# Patient Record
Sex: FEMALE | Race: WHITE | NOT HISPANIC OR LATINO | Employment: STUDENT | URBAN - METROPOLITAN AREA
[De-identification: names, ages, dates, MRNs, and addresses within clinical notes are randomized per-mention and may not be internally consistent; named-entity substitution may affect disease eponyms.]

---
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
## 2019-11-15 ENCOUNTER — OFFICE VISIT (OUTPATIENT)
Dept: INTERNAL MEDICINE CLINIC | Facility: CLINIC | Age: 19
End: 2019-11-15
Payer: COMMERCIAL

## 2019-11-15 VITALS
HEIGHT: 64 IN | DIASTOLIC BLOOD PRESSURE: 74 MMHG | BODY MASS INDEX: 20.45 KG/M2 | RESPIRATION RATE: 12 BRPM | OXYGEN SATURATION: 99 % | WEIGHT: 119.8 LBS | TEMPERATURE: 101 F | HEART RATE: 104 BPM | SYSTOLIC BLOOD PRESSURE: 116 MMHG

## 2019-11-15 DIAGNOSIS — J02.9 SORE THROAT: Primary | ICD-10-CM

## 2019-11-15 LAB — S PYO AG THROAT QL: NEGATIVE

## 2019-11-15 PROCEDURE — 87880 STREP A ASSAY W/OPTIC: CPT | Performed by: NURSE PRACTITIONER

## 2019-11-15 PROCEDURE — 87070 CULTURE OTHR SPECIMN AEROBIC: CPT | Performed by: NURSE PRACTITIONER

## 2019-11-15 PROCEDURE — 87147 CULTURE TYPE IMMUNOLOGIC: CPT | Performed by: NURSE PRACTITIONER

## 2019-11-15 PROCEDURE — 99203 OFFICE O/P NEW LOW 30 MIN: CPT | Performed by: NURSE PRACTITIONER

## 2019-11-15 RX ORDER — AMOXICILLIN AND CLAVULANATE POTASSIUM 875; 125 MG/1; MG/1
1 TABLET, FILM COATED ORAL EVERY 12 HOURS SCHEDULED
Qty: 14 TABLET | Refills: 0 | Status: SHIPPED | OUTPATIENT
Start: 2019-11-15 | End: 2019-11-22

## 2019-11-15 NOTE — PROGRESS NOTES
Assessment/Plan:    BMI 20 0-20 9, adult  BMI Counseling: Body mass index is 20 56 kg/m²  The BMI is normal       Diagnoses and all orders for this visit:    Sore throat  -     Cancel: Throat culture; Future  -     POCT rapid strepA  -     Throat culture; Future  -     Throat culture  -     amoxicillin-clavulanate (AUGMENTIN) 875-125 mg per tablet; Take 1 tablet by mouth every 12 (twelve) hours for 7 days    BMI 20 0-20 9, adult          Subjective:      Patient ID: Martín Sinclair is a 20 y o  female  Since ysterday patient has been having fever of 101, sore throat and right sided swollen gland and R ear ache  No cough no congestion no chills      The following portions of the patient's history were reviewed and updated as appropriate: allergies, current medications, past family history, past medical history, past social history, past surgical history and problem list     Review of Systems   Constitutional: Positive for chills, fatigue and fever  HENT: Positive for sore throat  Eyes: Negative  Respiratory: Negative  Cardiovascular: Negative  Gastrointestinal: Negative  Musculoskeletal: Negative  Neurological: Negative  Objective:      /74 (BP Location: Left arm, Patient Position: Sitting, Cuff Size: Adult)   Pulse 104   Temp (!) 101 °F (38 3 °C) (Oral)   Resp 12   Ht 5' 4" (1 626 m)   Wt 54 3 kg (119 lb 12 8 oz)   SpO2 99%   BMI 20 56 kg/m²          Physical Exam   Constitutional: She is oriented to person, place, and time  She appears well-developed and well-nourished  HENT:   Head: Normocephalic and atraumatic  Right Ear: External ear normal    Left Ear: External ear normal    Nose: Nose normal    Mouth/Throat: Posterior oropharyngeal erythema present  R submandibular gland swollen   Eyes: Pupils are equal, round, and reactive to light  Conjunctivae are normal    Neck: Normal range of motion  Neck supple  Cardiovascular: Normal rate and regular rhythm  Pulmonary/Chest: Effort normal and breath sounds normal    Abdominal: Soft  Bowel sounds are normal    Musculoskeletal: Normal range of motion  Neurological: She is alert and oriented to person, place, and time  Skin: Skin is warm and dry  Nursing note and vitals reviewed

## 2019-11-18 LAB — BACTERIA THROAT CULT: ABNORMAL

## 2019-11-27 ENCOUNTER — OFFICE VISIT (OUTPATIENT)
Dept: URGENT CARE | Age: 19
End: 2019-11-27
Payer: COMMERCIAL

## 2019-11-27 VITALS
WEIGHT: 118 LBS | HEIGHT: 64 IN | HEART RATE: 94 BPM | OXYGEN SATURATION: 97 % | TEMPERATURE: 98.1 F | RESPIRATION RATE: 16 BRPM | SYSTOLIC BLOOD PRESSURE: 118 MMHG | DIASTOLIC BLOOD PRESSURE: 63 MMHG | BODY MASS INDEX: 20.14 KG/M2

## 2019-11-27 DIAGNOSIS — J02.0 STREP THROAT: Primary | ICD-10-CM

## 2019-11-27 LAB — S PYO AG THROAT QL: NEGATIVE

## 2019-11-27 PROCEDURE — 87880 STREP A ASSAY W/OPTIC: CPT | Performed by: PHYSICIAN ASSISTANT

## 2019-11-27 PROCEDURE — S9083 URGENT CARE CENTER GLOBAL: HCPCS | Performed by: PHYSICIAN ASSISTANT

## 2019-11-27 PROCEDURE — G0382 LEV 3 HOSP TYPE B ED VISIT: HCPCS | Performed by: PHYSICIAN ASSISTANT

## 2019-11-27 RX ORDER — AZITHROMYCIN 250 MG/1
TABLET, FILM COATED ORAL
Qty: 6 TABLET | Refills: 0 | Status: SHIPPED | OUTPATIENT
Start: 2019-11-27 | End: 2019-12-01

## 2019-11-28 NOTE — PROGRESS NOTES
[facility]        NAME: Francisco Champion is a 20 y o  female  : 2000    MRN: [id number]  DATE: 2019  TIME: 8:54 PM    Assessment and Plan   Strep throat [J02 0]  1  Strep throat  POCT rapid strepA    azithromycin (ZITHROMAX) 250 mg tablet         Patient Instructions       Follow up with PCP in 3-5 days  Proceed to  ER if symptoms worsen  Chief Complaint     Chief Complaint   Patient presents with    Sore Throat     Pt complaining of sore throat x1 week  She finished a course of amoxicillin for strep throat 2-3 days ago  History of Present Illness       Patient here for evaluation of return of a sore throat  Patient was recently treated for strep non A non G non C on culture  Patient was on Augmentin and finish the course  She did feel better and her throat looked better  She did not switch her toothbrush  Review of Systems   Review of Systems   Constitutional: Negative  HENT: Positive for sore throat  Negative for congestion, ear pain, postnasal drip, rhinorrhea, sinus pressure, sinus pain and trouble swallowing  Eyes: Negative  Respiratory: Negative  Cardiovascular: Negative            Current Medications       Current Outpatient Medications:     norgestimate-ethinyl estradiol (ORTHO-CYCLEN) 0 25-35 MG-MCG per tablet, Take 1 tablet by mouth daily, Disp: , Rfl:     azithromycin (ZITHROMAX) 250 mg tablet, Take 2 tablets day 1 then 1 tab days 2-5, Disp: 6 tablet, Rfl: 0    ondansetron (ZOFRAN-ODT) 4 mg disintegrating tablet, Take 1 tablet (4 mg total) by mouth every 6 (six) hours as needed for nausea or vomiting for up to 10 doses (Patient not taking: Reported on 11/15/2019), Disp: 10 tablet, Rfl: 0    Current Allergies     Allergies as of 2019    (No Known Allergies)            The following portions of the patient's history were reviewed and updated as appropriate: allergies, current medications, past family history, past medical history, past social history, past surgical history and problem list      History reviewed  No pertinent past medical history  Past Surgical History:   Procedure Laterality Date    WISDOM TOOTH EXTRACTION         Family History   Problem Relation Age of Onset    Thyroid disease Mother     No Known Problems Father          Medications have been verified  Objective   /63 (BP Location: Right arm, Patient Position: Sitting)   Pulse 94   Temp 98 1 °F (36 7 °C) (Temporal)   Resp 16   Ht 5' 4" (1 626 m)   Wt 53 5 kg (118 lb)   LMP 11/06/2019   SpO2 97%   BMI 20 25 kg/m²        Physical Exam     Physical Exam   Constitutional: She is oriented to person, place, and time  She appears well-developed and well-nourished  No distress  HENT:   Head: Normocephalic and atraumatic  Right Ear: External ear normal    Left Ear: External ear normal    Nose: Nose normal    Mouth/Throat: Oropharyngeal exudate present  Bilateral tonsillar erythema with +2 soft tissue swelling  Eyes: Pupils are equal, round, and reactive to light  Conjunctivae and EOM are normal    Lymphadenopathy:     She has cervical adenopathy  Neurological: She is alert and oriented to person, place, and time  Skin: Skin is warm and dry  No rash noted  She is not diaphoretic  Psychiatric: She has a normal mood and affect  Her behavior is normal  Judgment and thought content normal    Nursing note and vitals reviewed

## 2020-01-15 ENCOUNTER — OFFICE VISIT (OUTPATIENT)
Dept: INTERNAL MEDICINE CLINIC | Facility: CLINIC | Age: 20
End: 2020-01-15
Payer: COMMERCIAL

## 2020-01-15 VITALS
TEMPERATURE: 97.5 F | BODY MASS INDEX: 20.9 KG/M2 | SYSTOLIC BLOOD PRESSURE: 116 MMHG | HEIGHT: 64 IN | DIASTOLIC BLOOD PRESSURE: 70 MMHG | HEART RATE: 92 BPM | WEIGHT: 122.4 LBS | OXYGEN SATURATION: 98 %

## 2020-01-15 DIAGNOSIS — Z13.29 THYROID DISORDER SCREEN: ICD-10-CM

## 2020-01-15 DIAGNOSIS — Z13.1 SCREENING FOR DIABETES MELLITUS: ICD-10-CM

## 2020-01-15 DIAGNOSIS — Z13.6 SCREENING FOR CARDIOVASCULAR CONDITION: Primary | ICD-10-CM

## 2020-01-15 DIAGNOSIS — Z00.00 WELLNESS EXAMINATION: ICD-10-CM

## 2020-01-15 PROCEDURE — 99395 PREV VISIT EST AGE 18-39: CPT | Performed by: INTERNAL MEDICINE

## 2020-01-15 PROCEDURE — 3008F BODY MASS INDEX DOCD: CPT | Performed by: INTERNAL MEDICINE

## 2020-01-15 NOTE — PROGRESS NOTES
Assessment/Plan:    Wellness examination  Assessment and plan 1  Health maintenance annual wellness examination overall the patient is clinically stable and doing well, we encouraged the patient to follow a healthy and balanced diet  We recommend that the patient exercise routinely approximately 30 minutes 5 times per week   We have reviewed the patient's vaccines and have made recommendations for updates if necessary  annual flu shot recommended      We will be ordering screening laboratories which are age appropriate  Return to the office in   1 year   call if any problems  Problem List Items Addressed This Visit        Other    Wellness examination     Assessment and plan 1  Health maintenance annual wellness examination overall the patient is clinically stable and doing well, we encouraged the patient to follow a healthy and balanced diet  We recommend that the patient exercise routinely approximately 30 minutes 5 times per week   We have reviewed the patient's vaccines and have made recommendations for updates if necessary  annual flu shot recommended      We will be ordering screening laboratories which are age appropriate  Return to the office in   1 year   call if any problems  Other Visit Diagnoses     Screening for cardiovascular condition    -  Primary    Relevant Orders    Comprehensive metabolic panel    Lipid Panel with Direct LDL reflex    Screening for diabetes mellitus        Relevant Orders    CBC (Includes Diff/Plt) (Refl)    Thyroid disorder screen        Relevant Orders    TSH, 3rd generation            Subjective:      Patient ID: Naldo Bean is a 20 y o  female      HPI annual wellness exam, diet good , dentint, brush , floss ,  Esu 3 yr, exercize , flu no   1 bro none  Mother thyroid problem [address] goes yearly [place])  The following portions of the patient's history were reviewed and updated as appropriate: allergies, current medications, past family history, past medical history, past social history, past surgical history and problem list     Review of Systems   Constitutional: Negative for activity change, appetite change and unexpected weight change  Eyes: Negative for visual disturbance  Respiratory: Negative for cough and shortness of breath  Cardiovascular: Negative for chest pain  Gastrointestinal: Negative for abdominal pain, diarrhea, nausea and vomiting  Neurological: Negative for dizziness, light-headedness and headaches  Objective:    No follow-ups on file  No results found  No Known Allergies    No past medical history on file  Past Surgical History:   Procedure Laterality Date    WISDOM TOOTH EXTRACTION       Current Outpatient Medications on File Prior to Visit   Medication Sig Dispense Refill    norgestimate-ethinyl estradiol (ORTHO-CYCLEN) 0 25-35 MG-MCG per tablet Take 1 tablet by mouth daily      ondansetron (ZOFRAN-ODT) 4 mg disintegrating tablet Take 1 tablet (4 mg total) by mouth every 6 (six) hours as needed for nausea or vomiting for up to 10 doses (Patient not taking: Reported on 11/15/2019) 10 tablet 0     No current facility-administered medications on file prior to visit  Family History   Problem Relation Age of Onset    Thyroid disease Mother     No Known Problems Father      Social History     Socioeconomic History    Marital status: Single     Spouse name: Not on file    Number of children: Not on file    Years of education: Not on file    Highest education level: Not on file   Occupational History    Occupation:      Employer: [address] [place] [address] Financial resource strain: Not hard at all    Food insecurity:     Worry: Never true     Inability: Never true    Transportation needs:     Medical: No     Non-medical: No   Tobacco Use    Smoking status: Never Smoker    Smokeless tobacco: Former User   Substance and Sexual Activity    Alcohol use:  Yes Comment: Weekends    Drug use: Never    Sexual activity: Yes     Partners: Male     Birth control/protection: Other   Lifestyle    Physical activity:     Days per week: 3 days     Minutes per session: 90 min    Stress: Very much   Relationships    Social connections:     Talks on phone: Never     Gets together: More than three times a week     Attends Hindu service: Never     Active member of club or organization: No     Attends meetings of clubs or organizations: Never     Relationship status: Not on file    Intimate partner violence:     Fear of current or ex partner: No     Emotionally abused: No     Physically abused: No     Forced sexual activity: No   Other Topics Concern    Not on file   Social History Narrative    Not on file     Vitals:    01/15/20 1408   BP: 116/70   BP Location: Left arm   Patient Position: Sitting   Cuff Size: Large   Pulse: 92   Temp: 97 5 °F (36 4 °C)   SpO2: 98%   Weight: 55 5 kg (122 lb 6 4 oz)   Height: 5' 4" (1 626 m)     Results for orders placed or performed in visit on 11/27/19   POCT rapid strepA   Result Value Ref Range     RAPID STREP A Negative Negative     Weight (last 2 days)     None        Body mass index is 21 01 kg/m²  BP      Temp      Pulse     Resp      SpO2        Vitals:    01/15/20 1408   Weight: 55 5 kg (122 lb 6 4 oz)     Vitals:    01/15/20 1408   Weight: 55 5 kg (122 lb 6 4 oz)       /70 (BP Location: Left arm, Patient Position: Sitting, Cuff Size: Large)   Pulse 92   Temp 97 5 °F (36 4 °C)   Ht 5' 4" (1 626 m)   Wt 55 5 kg (122 lb 6 4 oz)   SpO2 98%   BMI 21 01 kg/m²          Physical Exam   Constitutional: She appears well-developed and well-nourished  HENT:   Head: Normocephalic  Mouth/Throat: Oropharynx is clear and moist    Eyes: Pupils are equal, round, and reactive to light  Conjunctivae are normal  Right eye exhibits no discharge  Left eye exhibits no discharge  No scleral icterus  Neck: Neck supple  Cardiovascular: Normal rate, regular rhythm, normal heart sounds and intact distal pulses  Exam reveals no gallop and no friction rub  No murmur heard  Pulmonary/Chest: Breath sounds normal  No respiratory distress  She has no wheezes  She has no rales  Abdominal: Soft  Bowel sounds are normal  She exhibits no distension and no mass  There is no tenderness  There is no rebound and no guarding  Musculoskeletal: She exhibits no edema or deformity  Lymphadenopathy:     She has no cervical adenopathy  Neurological: She is alert   Coordination normal

## 2020-01-19 PROBLEM — Z00.00 WELLNESS EXAMINATION: Status: ACTIVE | Noted: 2020-01-19

## 2020-01-19 NOTE — ASSESSMENT & PLAN NOTE
Assessment and plan 1  Health maintenance annual wellness examination overall the patient is clinically stable and doing well, we encouraged the patient to follow a healthy and balanced diet  We recommend that the patient exercise routinely approximately 30 minutes 5 times per week   We have reviewed the patient's vaccines and have made recommendations for updates if necessary  annual flu shot recommended      We will be ordering screening laboratories which are age appropriate  Return to the office in   1 year   call if any problems

## 2020-08-28 ENCOUNTER — TELEPHONE (OUTPATIENT)
Dept: INTERNAL MEDICINE CLINIC | Facility: CLINIC | Age: 20
End: 2020-08-28

## 2020-08-28 NOTE — TELEPHONE ENCOUNTER
Dr Eladia Dominguez said okay to use preperation H 0 25% over the weekend  Avoid sitting in the bath tub  If patient develops symptoms such as increase pain/ blood in stool patient should get evaluated at the ER

## 2020-08-28 NOTE — TELEPHONE ENCOUNTER
Patient wants to be seen by a female doctor  She scheduled an appointment with you for Monday because of discomfort in rectum  Her mother called to make the appointment and said pt is having itching, burning, feels like she has to go to the bathroom but it is painful when she goes and after  They are asking if a using preparation H 0 25% is ok for her to get through this weekend? Not sure if it's a berrios/ Hemorid but would sitting in hot tub be good or helpful for it?

## 2020-08-31 ENCOUNTER — OFFICE VISIT (OUTPATIENT)
Dept: INTERNAL MEDICINE CLINIC | Facility: CLINIC | Age: 20
End: 2020-08-31
Payer: COMMERCIAL

## 2020-08-31 VITALS
DIASTOLIC BLOOD PRESSURE: 70 MMHG | SYSTOLIC BLOOD PRESSURE: 120 MMHG | TEMPERATURE: 97.5 F | OXYGEN SATURATION: 97 % | BODY MASS INDEX: 21.01 KG/M2 | HEART RATE: 80 BPM | HEIGHT: 64 IN

## 2020-08-31 DIAGNOSIS — F41.9 ANXIETY: ICD-10-CM

## 2020-08-31 DIAGNOSIS — K62.89 RECTAL DISCOMFORT: Primary | ICD-10-CM

## 2020-08-31 PROBLEM — J02.9 SORE THROAT: Status: RESOLVED | Noted: 2019-11-15 | Resolved: 2020-08-31

## 2020-08-31 PROCEDURE — 99213 OFFICE O/P EST LOW 20 MIN: CPT | Performed by: GENERAL ACUTE CARE HOSPITAL

## 2020-08-31 PROCEDURE — 1036F TOBACCO NON-USER: CPT | Performed by: GENERAL ACUTE CARE HOSPITAL

## 2020-08-31 NOTE — ASSESSMENT & PLAN NOTE
Likely hemorrhoid vs  Fissure  Since she is improving with current OTC regimen, continue this  F/u as needed

## 2020-08-31 NOTE — PROGRESS NOTES
Assessment/Plan:    Rectal discomfort  Likely hemorrhoid vs  Fissure  Since she is improving with current OTC regimen, continue this  F/u as needed  Anxiety  [id number]  Refer to behavior health       Diagnoses and all orders for this visit:    Rectal discomfort    Anxiety  -     Ambulatory referral to Violet[facility] Ar[facility]; Future    Other orders  -     Cancel: HPV VACCINE 9 VALENT IM  -     Cancel: Ambulatory referral to Gynecology; Future          Subjective:      Patient ID: Maxim Burrell is a 23 y o  female  HPI    Perianal burning itchiness and pain with defecation for a week  Started otc lido cream, stool softer and suppository, feels much better since then  The following portions of the patient's history were reviewed and updated as appropriate: past family history, past social history and past surgical history  Review of Systems   Constitutional: Negative for chills, fatigue and fever  HENT: Negative for congestion, nosebleeds, postnasal drip, sneezing, sore throat and trouble swallowing  Eyes: Negative for pain  Respiratory: Negative for cough, chest tightness, shortness of breath and wheezing  Cardiovascular: Negative for chest pain, palpitations and leg swelling  Gastrointestinal: Negative for abdominal pain, constipation, diarrhea, nausea and vomiting  Endocrine: Negative for cold intolerance, heat intolerance, polydipsia and polyuria  Genitourinary: Negative for difficulty urinating, dysuria, flank pain and hematuria  Musculoskeletal: Negative for arthralgias and myalgias  Skin: Negative for rash  Neurological: Negative for dizziness, tremors, weakness and headaches  Hematological: Does not bruise/bleed easily  Psychiatric/Behavioral: Negative for confusion and dysphoric mood  The patient is not nervous/anxious            Objective:      /70   Pulse 80   Temp 97 5 °F (36 4 °C)   Ht 5' 4" (1 626 m)   SpO2 97%   BMI 21 01 kg/m²          Physical Exam  Constitutional:       General: She is not in acute distress  Appearance: She is well-developed  HENT:      Head: Normocephalic and atraumatic  Right Ear: External ear normal       Left Ear: External ear normal    Eyes:      General: No scleral icterus  Conjunctiva/sclera: Conjunctivae normal    Neck:      Musculoskeletal: Normal range of motion and neck supple  Thyroid: No thyromegaly  Trachea: No tracheal deviation  Cardiovascular:      Rate and Rhythm: Normal rate and regular rhythm  Heart sounds: Normal heart sounds  Pulmonary:      Effort: Pulmonary effort is normal  No respiratory distress  Breath sounds: Normal breath sounds  No wheezing or rales  Abdominal:      General: Bowel sounds are normal       Palpations: Abdomen is soft  Tenderness: There is no abdominal tenderness  There is no guarding or rebound  Comments: Pt declined rectal exam   Lymphadenopathy:      Cervical: No cervical adenopathy  Neurological:      Mental Status: She is alert and oriented to person, place, and time  Psychiatric:         Behavior: Behavior normal          Thought Content:  Thought content normal          Judgment: Judgment normal

## 2020-08-31 NOTE — PATIENT INSTRUCTIONS
For your rectal symptoms, likely hemorrhoids vs  Fissure  Continue stool softener, lidocaine cream  Drink plenty of water, eats lots of fruit, vegetable, high-fiber diet  You could also try [employer]  If not getting better call back       Refer to behavior health to talk about therapist

## 2020-09-01 ENCOUNTER — TELEPHONE (OUTPATIENT)
Dept: PSYCHIATRY | Facility: CLINIC | Age: 20
End: 2020-09-01

## 2020-09-04 ENCOUNTER — TELEPHONE (OUTPATIENT)
Dept: PSYCHIATRY | Facility: CLINIC | Age: 20
End: 2020-09-04

## 2020-09-09 ENCOUNTER — TELEPHONE (OUTPATIENT)
Dept: PSYCHIATRY | Facility: CLINIC | Age: 20
End: 2020-09-09

## 2020-09-12 LAB
ALBUMIN SERPL-MCNC: 4.3 G/DL (ref 3.6–5.1)
ALBUMIN/GLOB SERPL: 1.7 (CALC) (ref 1–2.5)
ALP SERPL-CCNC: 47 U/L (ref 31–125)
ALT SERPL-CCNC: 7 U/L (ref 6–29)
AST SERPL-CCNC: 12 U/L (ref 10–30)
BASOPHILS # BLD AUTO: 50 CELLS/UL (ref 0–200)
BASOPHILS NFR BLD AUTO: 0.9 %
BILIRUB SERPL-MCNC: 0.3 MG/DL (ref 0.2–1.2)
BUN SERPL-MCNC: 9 MG/DL (ref 7–25)
BUN/CREAT SERPL: NORMAL (CALC) (ref 6–22)
CALCIUM SERPL-MCNC: 9.4 MG/DL (ref 8.6–10.2)
CHLORIDE SERPL-SCNC: 106 MMOL/L (ref 98–110)
CHOLEST SERPL-MCNC: 190 MG/DL
CHOLEST/HDLC SERPL: 4 (CALC)
CO2 SERPL-SCNC: 27 MMOL/L (ref 20–32)
CREAT SERPL-MCNC: 0.8 MG/DL (ref 0.5–1.1)
EOSINOPHIL # BLD AUTO: 62 CELLS/UL (ref 15–500)
EOSINOPHIL NFR BLD AUTO: 1.1 %
ERYTHROCYTE [DISTWIDTH] IN BLOOD BY AUTOMATED COUNT: 11.8 % (ref 11–15)
GLOBULIN SER CALC-MCNC: 2.6 G/DL (CALC) (ref 1.9–3.7)
GLUCOSE SERPL-MCNC: 81 MG/DL (ref 65–99)
HCT VFR BLD AUTO: 37.4 % (ref 35–45)
HDLC SERPL-MCNC: 47 MG/DL
HGB BLD-MCNC: 12.3 G/DL (ref 11.7–15.5)
LDLC SERPL CALC-MCNC: 112 MG/DL (CALC)
LYMPHOCYTES # BLD AUTO: 2425 CELLS/UL (ref 850–3900)
LYMPHOCYTES NFR BLD AUTO: 43.3 %
MCH RBC QN AUTO: 30.1 PG (ref 27–33)
MCHC RBC AUTO-ENTMCNC: 32.9 G/DL (ref 32–36)
MCV RBC AUTO: 91.7 FL (ref 80–100)
MONOCYTES # BLD AUTO: 403 CELLS/UL (ref 200–950)
MONOCYTES NFR BLD AUTO: 7.2 %
NEUTROPHILS # BLD AUTO: 2660 CELLS/UL (ref 1500–7800)
NEUTROPHILS NFR BLD AUTO: 47.5 %
NONHDLC SERPL-MCNC: 143 MG/DL (CALC)
PLATELET # BLD AUTO: 264 THOUSAND/UL (ref 140–400)
PMV BLD REES-ECKER: 10.7 FL (ref 7.5–12.5)
POTASSIUM SERPL-SCNC: 4.1 MMOL/L (ref 3.5–5.3)
PROT SERPL-MCNC: 6.9 G/DL (ref 6.1–8.1)
RBC # BLD AUTO: 4.08 MILLION/UL (ref 3.8–5.1)
SL AMB EGFR AFRICAN AMERICAN: 122 ML/MIN/1.73M2
SL AMB EGFR NON AFRICAN AMERICAN: 105 ML/MIN/1.73M2
SODIUM SERPL-SCNC: 140 MMOL/L (ref 135–146)
TRIGL SERPL-MCNC: 184 MG/DL
TSH SERPL-ACNC: 6.06 MIU/L
WBC # BLD AUTO: 5.6 THOUSAND/UL (ref 3.8–10.8)

## 2020-09-15 DIAGNOSIS — R79.89 ELEVATED TSH: Primary | ICD-10-CM

## 2020-09-18 ENCOUNTER — APPOINTMENT (OUTPATIENT)
Dept: LAB | Facility: CLINIC | Age: 20
End: 2020-09-18
Payer: COMMERCIAL

## 2020-09-18 DIAGNOSIS — R79.89 ELEVATED TSH: ICD-10-CM

## 2020-09-18 LAB
T4 FREE SERPL-MCNC: 1.09 NG/DL (ref 0.76–1.46)
TSH SERPL DL<=0.05 MIU/L-ACNC: 3.27 UIU/ML (ref 0.36–3.74)

## 2020-09-18 PROCEDURE — 84439 ASSAY OF FREE THYROXINE: CPT

## 2020-09-18 PROCEDURE — 84443 ASSAY THYROID STIM HORMONE: CPT

## 2020-09-18 PROCEDURE — 36415 COLL VENOUS BLD VENIPUNCTURE: CPT

## 2020-09-23 ENCOUNTER — TELEPHONE (OUTPATIENT)
Dept: INTERNAL MEDICINE CLINIC | Facility: CLINIC | Age: 20
End: 2020-09-23

## 2020-12-17 ENCOUNTER — TELEMEDICINE (OUTPATIENT)
Dept: BEHAVIORAL/MENTAL HEALTH CLINIC | Facility: CLINIC | Age: 20
End: 2020-12-17
Payer: COMMERCIAL

## 2020-12-17 DIAGNOSIS — F41.9 ANXIETY: Primary | ICD-10-CM

## 2020-12-17 PROCEDURE — 90791 PSYCH DIAGNOSTIC EVALUATION: CPT | Performed by: SOCIAL WORKER

## 2021-01-18 ENCOUNTER — OFFICE VISIT (OUTPATIENT)
Dept: INTERNAL MEDICINE CLINIC | Facility: CLINIC | Age: 21
End: 2021-01-18
Payer: COMMERCIAL

## 2021-01-18 VITALS
HEART RATE: 70 BPM | BODY MASS INDEX: 20.66 KG/M2 | TEMPERATURE: 96.8 F | HEIGHT: 64 IN | WEIGHT: 121 LBS | DIASTOLIC BLOOD PRESSURE: 70 MMHG | OXYGEN SATURATION: 100 % | SYSTOLIC BLOOD PRESSURE: 125 MMHG

## 2021-01-18 DIAGNOSIS — F41.9 ANXIETY: ICD-10-CM

## 2021-01-18 DIAGNOSIS — Z11.1 ENCOUNTER FOR PPD TEST: ICD-10-CM

## 2021-01-18 DIAGNOSIS — K62.89 RECTAL DISCOMFORT: ICD-10-CM

## 2021-01-18 DIAGNOSIS — Z11.59 NEED FOR HEPATITIS C SCREENING TEST: ICD-10-CM

## 2021-01-18 DIAGNOSIS — Z11.4 SCREENING FOR HIV (HUMAN IMMUNODEFICIENCY VIRUS): ICD-10-CM

## 2021-01-18 DIAGNOSIS — K62.89 RECTAL PAIN: Primary | ICD-10-CM

## 2021-01-18 DIAGNOSIS — R19.09 GROIN MASS IN FEMALE: ICD-10-CM

## 2021-01-18 PROCEDURE — 3008F BODY MASS INDEX DOCD: CPT | Performed by: GENERAL ACUTE CARE HOSPITAL

## 2021-01-18 PROCEDURE — 1036F TOBACCO NON-USER: CPT | Performed by: GENERAL ACUTE CARE HOSPITAL

## 2021-01-18 PROCEDURE — 99395 PREV VISIT EST AGE 18-39: CPT | Performed by: GENERAL ACUTE CARE HOSPITAL

## 2021-01-18 PROCEDURE — 3725F SCREEN DEPRESSION PERFORMED: CPT | Performed by: GENERAL ACUTE CARE HOSPITAL

## 2021-01-18 PROCEDURE — 86580 TB INTRADERMAL TEST: CPT

## 2021-01-18 RX ORDER — PRAMOXINE HYDROCHLORIDE 10 MG/G
1 AEROSOL, FOAM TOPICAL DAILY PRN
Qty: 1 CAN | Refills: 0 | Status: SHIPPED | OUTPATIENT
Start: 2021-01-18 | End: 2021-08-13 | Stop reason: ALTCHOICE

## 2021-01-18 NOTE — LETTER
Carl Campos, I just want to notify you that I changed the referral from "general surgery" to "colorectal surgery"  When you make appointment (if you decided to), please tell them you need to see a colorectal surgeon       Mary Ellen Castillo MD

## 2021-01-18 NOTE — PROGRESS NOTES
Assessment/Plan:    Rectal discomfort  hemorrhoid vs  Fissure  Refer to gen surgery  Anxiety  Stable  Seeing a therapist      Groin mass in female  Already getting smaller no palpable large nodes on exam  Pt is still concerned  Will get a groin US  No sign of LE infection  Encourage pt to make appointment with her ob/gyn, to discuss need to check STI    Annual physical exam  Encourage to make appointment with her ob/gyn  Needs Pap  Need to get immunization record from her pediatrician  Check HIV , hepC for routine screen  Diagnoses and all orders for this visit:    Rectal pain  -     Ambulatory referral to General Surgery; Future  -     Pramoxine HCl, Perianal, 1 % FOAM; Apply 1 application topically daily as needed (rectal pain)    Groin mass in female  -     US groin/inguinal area; Future    Screening for HIV (human immunodeficiency virus)  -     HIV 1/2 Antigen/Antibody (4th Generation) w Reflex [facility]; Future    Need for hepatitis C screening test  -     Hepatitis C antibody; Future    Encounter for PPD test  -     TB Skin Test    Anxiety    Rectal discomfort          Subjective:      Patient ID: Diony Carroll is a 23 y o  female  HPI    Annual physical      Rectal pain: still has it on and off  Uses OTC cream and suppository  Has constipation sometimes  Pain during defecation  Groin lymph node: noticed olive sized lymph node in left groin in Dec  Now getting smaller but still concerned about it  Need PPD for school form  The following portions of the patient's history were reviewed and updated as appropriate: allergies, past family history, past medical history and past surgical history  Review of Systems   Constitutional: Negative for chills, fatigue and fever  HENT: Negative for congestion, nosebleeds, postnasal drip, sneezing, sore throat and trouble swallowing  Eyes: Negative for pain     Respiratory: Negative for cough, chest tightness, shortness of breath and wheezing  Cardiovascular: Negative for chest pain, palpitations and leg swelling  Gastrointestinal: Negative for abdominal pain, constipation, diarrhea, nausea and vomiting  Endocrine: Negative for cold intolerance, heat intolerance, polydipsia and polyuria  Genitourinary: Negative for difficulty urinating, dysuria, flank pain and hematuria  Musculoskeletal: Negative for arthralgias and myalgias  Skin: Negative for rash  Neurological: Negative for dizziness, tremors, weakness and headaches  Hematological: Does not bruise/bleed easily  Psychiatric/Behavioral: Negative for confusion and dysphoric mood  The patient is not nervous/anxious  Objective:      /70   Pulse 70   Temp (!) 96 8 °F (36 °C) (Temporal)   Ht 5' 4" (1 626 m)   Wt 54 9 kg (121 lb)   SpO2 100%   BMI 20 77 kg/m²          Physical Exam  Constitutional:       General: She is not in acute distress  Appearance: She is well-developed  HENT:      Head: Normocephalic and atraumatic  Right Ear: External ear normal       Left Ear: External ear normal    Eyes:      General: No scleral icterus  Conjunctiva/sclera: Conjunctivae normal    Neck:      Musculoskeletal: Normal range of motion and neck supple  Thyroid: No thyromegaly  Trachea: No tracheal deviation  Cardiovascular:      Rate and Rhythm: Normal rate and regular rhythm  Heart sounds: Normal heart sounds  Pulmonary:      Effort: Pulmonary effort is normal  No respiratory distress  Breath sounds: Normal breath sounds  No wheezing or rales  Abdominal:      General: Bowel sounds are normal       Palpations: Abdomen is soft  Tenderness: There is no abdominal tenderness  There is no guarding or rebound  Genitourinary:     Comments: No palpable enlarged lymph nodes in groin  Lymphadenopathy:      Cervical: No cervical adenopathy  Neurological:      Mental Status: She is alert and oriented to person, place, and time  Psychiatric:         Behavior: Behavior normal          Thought Content:  Thought content normal          Judgment: Judgment normal

## 2021-01-18 NOTE — ASSESSMENT & PLAN NOTE
Encourage to make appointment with her ob/gyn  Needs Pap  Need to get immunization record from her pediatrician  Check HIV , hepC for routine screen

## 2021-01-18 NOTE — ASSESSMENT & PLAN NOTE
Already getting smaller no palpable large nodes on exam  Pt is still concerned  Will get a groin US  No sign of LE infection   Encourage pt to make appointment with her ob/gyn, to discuss need to check STI

## 2021-01-18 NOTE — PATIENT INSTRUCTIONS
1  Ordered groin ultrasound  2  Refer to rectal doctor (general surgery)  Make appointment if your rectal symptoms get workse  3  Ordered HIV and Hep C test for general screening  4  Please make appointment with ob/gyn for your annual check up

## 2021-01-20 ENCOUNTER — CLINICAL SUPPORT (OUTPATIENT)
Dept: INTERNAL MEDICINE CLINIC | Facility: CLINIC | Age: 21
End: 2021-01-20

## 2021-01-20 ENCOUNTER — SOCIAL WORK (OUTPATIENT)
Dept: BEHAVIORAL/MENTAL HEALTH CLINIC | Facility: CLINIC | Age: 21
End: 2021-01-20
Payer: COMMERCIAL

## 2021-01-20 DIAGNOSIS — F40.10 SOCIAL ANXIETY DISORDER: Primary | ICD-10-CM

## 2021-01-20 DIAGNOSIS — Z11.1 ENCOUNTER FOR PPD SKIN TEST READING: Primary | ICD-10-CM

## 2021-01-20 LAB
INDURATION: 0 MM
TB SKIN TEST: NEGATIVE

## 2021-01-20 PROCEDURE — 90834 PSYTX W PT 45 MINUTES: CPT | Performed by: SOCIAL WORKER

## 2021-01-20 NOTE — BH TREATMENT PLAN
Valerio Henning  2000       Date of Initial Treatment Plan: 1/20/21  Date of Current Treatment Plan: 01/20/21    Treatment Plan Number 1     Strengths/Personal Resources for Self Care: caring, hard working    Diagnosis:   Social anxiety  Area of Needs: I have social anxiety  Long Term Goal 1: I want to decrease my social anxiety  Target Date: 7/21  Completion Date: N/A         Short Term Objectives for Goal 1: I will go to the gym  I will stop at the grocery store to pick 1-2 things up  I will say "Hi" to people at the gym, grocery store, etc              I will start asking people questions  I will start talking to others and be present  I will to go [employer] at school  I will participate in my classes  I will ask for clarification on things  Long Term Goal 2: N/A    Target Date: N/A  Completion Date: N/A    Short Term Objectives for Goal 2: N/A         Long Term Goal # 3: N/A     Target Date: N/A  Completion Date: N/A    Short Term Objectives for Goal 3: N/A    GOAL 1: Modality: Individual 2x per month   Completion Date NA and The person(s) responsible for carrying out the plan is  Mohit Banks  GOAL 2: Modality:NA     GOAL 3: Modality:NA       Behavioral Health Treatment Plan [facility]: Diagnosis and Treatment Plan explained to Alessia Johnson relates understanding diagnosis and is agreeable to Treatment Plan         Client Comments : Please share your thoughts, feelings, need and/or experiences regarding your treatment plan:

## 2021-01-20 NOTE — PSYCH
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

## 2021-02-03 ENCOUNTER — TELEMEDICINE (OUTPATIENT)
Dept: BEHAVIORAL/MENTAL HEALTH CLINIC | Facility: CLINIC | Age: 21
End: 2021-02-03
Payer: COMMERCIAL

## 2021-02-03 DIAGNOSIS — F40.10 SOCIAL ANXIETY DISORDER: Primary | ICD-10-CM

## 2021-02-03 PROCEDURE — 90834 PSYTX W PT 45 MINUTES: CPT | Performed by: SOCIAL WORKER

## 2021-02-03 NOTE — PSYCH
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  She is "nervous about that "   She has been saying hi to people at the gym when she enters and exits the gym  "This makes her feel good "  Discussed her anxiety in great detail  A:    Mild progress on goals  Her confidence level appears to play a roll in her social anxiety  P:  To continues addressing tx plan goals  Behavioral Health Treatment Plan [facility]: Diagnosis and Treatment Plan explained to Pearl Mccartney relates understanding diagnosis and is agreeable to Treatment Plan  Yes   Virtual Regular Visit      Assessment/Plan:    Problem List Items Addressed This Visit        Other    Social anxiety disorder - Primary               Reason for visit is No chief complaint on file  Encounter provider Angella Harris    Provider located at [address] [place] [id number] [phone]      Recent Visits  No visits were found meeting these conditions  Showing recent visits within past 7 days and meeting all other requirements     Future Appointments  No visits were found meeting these conditions  Showing future appointments within next 150 days and meeting all other requirements        The patient was identified by name and date of birth  Ella Hayward was informed that this is a telemedicine visit and that the visit is being conducted through [employer] and patient was informed that this is a secure, HIPAA-compliant platform  She agrees to proceed     My office door was closed  No one else was in the room    She acknowledged consent and understanding of privacy and security of the video platform  The patient has agreed to participate and understands they can discontinue the visit at any time  Patient is aware this is a billable service  Bettye Saint is a 23 y o  female    HPI     No past medical history on file  Past Surgical History:   Procedure Laterality Date    WISDOM TOOTH EXTRACTION         Current Outpatient Medications   Medication Sig Dispense Refill    norgestimate-ethinyl estradiol (ORTHO-CYCLEN) 0 25-35 MG-MCG per tablet Take 1 tablet by mouth daily      Pramoxine HCl, Perianal, 1 % FOAM Apply 1 application topically daily as needed (rectal pain) 1 Can 0     No current facility-administered medications for this visit  No Known Allergies    Review of Systems    Video Exam    There were no vitals filed for this visit  Physical Exam     I spent 45 minutes directly with the patient during this visit      VIRTUAL VISIT DISCLAIMER    Pedro Calhoun acknowledges that she has consented to an online visit or consultation  She understands that the online visit is based solely on information provided by her, and that, in the absence of a face-to-face physical evaluation by the physician, the diagnosis she receives is both limited and provisional in terms of accuracy and completeness  This is not intended to replace a full medical face-to-face evaluation by the physician  Pedro Calhoun understands and accepts these terms

## 2021-02-17 ENCOUNTER — TELEMEDICINE (OUTPATIENT)
Dept: BEHAVIORAL/MENTAL HEALTH CLINIC | Facility: CLINIC | Age: 21
End: 2021-02-17
Payer: COMMERCIAL

## 2021-02-17 DIAGNOSIS — F40.10 SOCIAL ANXIETY DISORDER: Primary | ICD-10-CM

## 2021-02-17 PROCEDURE — 90834 PSYTX W PT 45 MINUTES: CPT | Performed by: SOCIAL WORKER

## 2021-02-17 NOTE — PSYCH
Psychotherapy Provided: Individual Psychotherapy 45 minutes     Length of time in session: 45 minutes, follow up in 2 week    Goals addressed in session: 1    Pain:      none    0    Current suicide risk : Low     D:  Met with Beth for session  She went into a grocery store  It wasn't the one near her house but, near her gym  That made it easier to do since it decreased the fear of running into someone she knows  Discussed her irrational thoughts regarding "what people are thinking "  Her birthday was this weekend  She became upset over her mother inviting her friends over and Luís Garcia comparing her self to the couples' daughter, who did not come to the party  Discussed her history of anxiety when she was younger  A:    Mild progress on goals  P:  To continues addressing tx plan goals  Apply coping skills discussed in session  Behavioral Health Treatment Plan [facility]: Diagnosis and Treatment Plan explained to Pearl Mccartney relates understanding diagnosis and is agreeable to Treatment Plan  Yes   Virtual Regular Visit      Assessment/Plan:    Problem List Items Addressed This Visit     None               Reason for visit is No chief complaint on file  Encounter provider Angella Harris    Provider located at [address] [place] [id number] [phone]      Recent Visits  No visits were found meeting these conditions  Showing recent visits within past 7 days and meeting all other requirements     Future Appointments  No visits were found meeting these conditions  Showing future appointments within next 150 days and meeting all other requirements        The patient was identified by name and date of birth   Ella Hayward was informed that this is a telemedicine visit and that the visit is being conducted through [employer] and patient was informed that this is a secure, HIPAA-compliant platform  She agrees to proceed     My office door was closed  No one else was in the room  She acknowledged consent and understanding of privacy and security of the video platform  The patient has agreed to participate and understands they can discontinue the visit at any time  Patient is aware this is a billable service  Do Portillo is a 24 y o  female    HPI     No past medical history on file  Past Surgical History:   Procedure Laterality Date    WISDOM TOOTH EXTRACTION         Current Outpatient Medications   Medication Sig Dispense Refill    norgestimate-ethinyl estradiol (ORTHO-CYCLEN) 0 25-35 MG-MCG per tablet Take 1 tablet by mouth daily      Pramoxine HCl, Perianal, 1 % FOAM Apply 1 application topically daily as needed (rectal pain) 1 Can 0     No current facility-administered medications for this visit  No Known Allergies    Review of Systems    Video Exam    There were no vitals filed for this visit  Physical Exam     I spent 45 minutes directly with the patient during this visit      VIRTUAL VISIT DISCLAIMER    Merissa Luo acknowledges that she has consented to an online visit or consultation  She understands that the online visit is based solely on information provided by her, and that, in the absence of a face-to-face physical evaluation by the physician, the diagnosis she receives is both limited and provisional in terms of accuracy and completeness  This is not intended to replace a full medical face-to-face evaluation by the physician  Merissa Luo understands and accepts these terms

## 2021-03-02 ENCOUNTER — TELEMEDICINE (OUTPATIENT)
Dept: BEHAVIORAL/MENTAL HEALTH CLINIC | Facility: CLINIC | Age: 21
End: 2021-03-02
Payer: COMMERCIAL

## 2021-03-02 DIAGNOSIS — F40.10 SOCIAL ANXIETY DISORDER: Primary | ICD-10-CM

## 2021-03-02 PROCEDURE — 90834 PSYTX W PT 45 MINUTES: CPT | Performed by: SOCIAL WORKER

## 2021-03-02 NOTE — PSYCH
Psychotherapy Provided: Individual Psychotherapy 45 minutes     Length of time in session: 45 minutes, follow up in 2 week    Goals addressed in session: 1    Pain:      none    0    Current suicide risk : Low     D:  Met with Beth for session  She was going to do an in person session but, has strep throat  She did not want to cx so she did a virtual session  She "usually gets strep about once per year  Unsure how she does because she is somewhat of a germ a phobe "  She washes her hands a great deal and cleans her bathroom and bedroom so they are really clean  Her friends comment to her regarding her over cleaning  Discussed "too clean" can also lead to illnesses  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 "      A:    Mild progress on goals  P:  To continues addressing tx plan goals  Apply coping skills discussed in session  Behavioral Health Treatment Plan [facility]: Diagnosis and Treatment Plan explained to Sharad Pinzon relates understanding diagnosis and is agreeable to Treatment Plan  Yes   Virtual Regular Visit      Assessment/Plan:    Problem List Items Addressed This Visit     None               Reason for visit is No chief complaint on file  Encounter provider Poonam Tena    Provider located at [address] [place] [id number] [phone]      Recent Visits  No visits were found meeting these conditions  Showing recent visits within past 7 days and meeting all other requirements     Future Appointments  No visits were found meeting these conditions  Showing future appointments within next 150 days and meeting all other requirements        The patient was identified by name and date of birth   Liliana Sherrill was informed that this is a telemedicine visit and that the visit is being conducted through [employer] and patient was informed that this is a secure, HIPAA-compliant platform  She agrees to proceed     My office door was closed  No one else was in the room  She acknowledged consent and understanding of privacy and security of the video platform  The patient has agreed to participate and understands they can discontinue the visit at any time  Patient is aware this is a billable service  Leonarda Patel is a 24 y o  female    HPI     No past medical history on file  Past Surgical History:   Procedure Laterality Date    WISDOM TOOTH EXTRACTION         Current Outpatient Medications   Medication Sig Dispense Refill    norgestimate-ethinyl estradiol (ORTHO-CYCLEN) 0 25-35 MG-MCG per tablet Take 1 tablet by mouth daily      Pramoxine HCl, Perianal, 1 % FOAM Apply 1 application topically daily as needed (rectal pain) 1 Can 0     No current facility-administered medications for this visit  No Known Allergies    Review of Systems    Video Exam    There were no vitals filed for this visit  Physical Exam     I spent 45 minutes directly with the patient during this visit      VIRTUAL VISIT DISCLAIMER    Pamela Caballero acknowledges that she has consented to an online visit or consultation  She understands that the online visit is based solely on information provided by her, and that, in the absence of a face-to-face physical evaluation by the physician, the diagnosis she receives is both limited and provisional in terms of accuracy and completeness  This is not intended to replace a full medical face-to-face evaluation by the physician  Pamela Caballero understands and accepts these terms

## 2021-03-06 ENCOUNTER — NURSE TRIAGE (OUTPATIENT)
Dept: OTHER | Facility: OTHER | Age: 21
End: 2021-03-06

## 2021-03-06 NOTE — TELEPHONE ENCOUNTER
Reason for Disposition   Taking prescription medication that could cause nausea (e g , narcotics/opiates, antibiotics, OCPs, many others)    Answer Assessment - Initial Assessment Questions  1  NAUSEA SEVERITY: "How bad is the nausea?" (e g , mild, moderate, severe; dehydration, weight loss)    - MILD: loss of appetite without change in eating habits    - MODERATE: decreased oral intake without significant weight loss, dehydration, or malnutrition    - SEVERE: inadequate caloric or fluid intake, significant weight loss, symptoms of dehydration       Moderate-severe     2  ONSET: "When did the nausea begin?"      3 days ago     3  VOMITING: "Any vomiting?" If so, ask: "How many times today?"      Denies, states "I gag every time I see food"    4  RECURRENT SYMPTOM: "Have you had nausea before?" If so, ask: "When was the last time?" "What happened that time?"      Unsure     5  CAUSE: "What do you think is causing the nausea?"      Unsure    6   PREGNANCY: "Is there any chance you are pregnant?" (e g , unprotected intercourse, missed birth control pill, broken condom)       Denies     Currently taking Amoxicillin for strep throat    Protocols used: NAUSEA-ADULT-

## 2021-03-06 NOTE — TELEPHONE ENCOUNTER
Regarding: severe nausea  ----- Message from Eddie Mccabe sent at 3/6/2021 11:47 AM EST -----  "I am having severe nausea"

## 2021-03-15 ENCOUNTER — TELEMEDICINE (OUTPATIENT)
Dept: BEHAVIORAL/MENTAL HEALTH CLINIC | Facility: CLINIC | Age: 21
End: 2021-03-15
Payer: COMMERCIAL

## 2021-03-15 DIAGNOSIS — F40.10 SOCIAL ANXIETY DISORDER: Primary | ICD-10-CM

## 2021-03-15 PROCEDURE — 90834 PSYTX W PT 45 MINUTES: CPT | Performed by: SOCIAL WORKER

## 2021-03-15 NOTE — PSYCH
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  But, with the schools opening so late she will only be able to attend 4 classes due to it is only 1 time/week and the semester is approaching it's end  She was unable to apply the homework discussed in last session due to she was away so she is going to work on it until next session  A:    Mild progress on goals  P:  To continues addressing tx plan goals  Behavioral Health Treatment Plan [facility]: Diagnosis and Treatment Plan explained to Hammad Hargrove relates understanding diagnosis and is agreeable to Treatment Plan  Yes   Virtual Regular Visit      Assessment/Plan:    Problem List Items Addressed This Visit     None               Reason for visit is No chief complaint on file  Encounter provider Kyra Yadav    Provider located at [address] [place] [id number] [phone]      Recent Visits  No visits were found meeting these conditions  Showing recent visits within past 7 days and meeting all other requirements     Future Appointments  No visits were found meeting these conditions  Showing future appointments within next 150 days and meeting all other requirements        The patient was identified by name and date of birth   Tasneem Drew was informed that this is a telemedicine visit and that the visit is being conducted through [employer] and patient was informed that this is a secure, HIPAA-compliant platform  She agrees to proceed     My office door was closed  No one else was in the room  She acknowledged consent and understanding of privacy and security of the video platform  The patient has agreed to participate and understands they can discontinue the visit at any time  Patient is aware this is a billable service  Vane To is a 24 y o  female    HPI     No past medical history on file  Past Surgical History:   Procedure Laterality Date    WISDOM TOOTH EXTRACTION         Current Outpatient Medications   Medication Sig Dispense Refill    norgestimate-ethinyl estradiol (ORTHO-CYCLEN) 0 25-35 MG-MCG per tablet Take 1 tablet by mouth daily      Pramoxine HCl, Perianal, 1 % FOAM Apply 1 application topically daily as needed (rectal pain) 1 Can 0     No current facility-administered medications for this visit  No Known Allergies    Review of Systems    Video Exam    There were no vitals filed for this visit  Physical Exam     I spent 45 minutes directly with the patient during this visit      VIRTUAL VISIT DISCLAIMER    Zhao Black acknowledges that she has consented to an online visit or consultation  She understands that the online visit is based solely on information provided by her, and that, in the absence of a face-to-face physical evaluation by the physician, the diagnosis she receives is both limited and provisional in terms of accuracy and completeness  This is not intended to replace a full medical face-to-face evaluation by the physician  Zhao Black understands and accepts these terms

## 2021-03-31 DIAGNOSIS — Z23 ENCOUNTER FOR IMMUNIZATION: ICD-10-CM

## 2021-03-31 LAB
EXTERNAL CHLAMYDIA RESULT: NOT DETECTED
N GONORRHOEA RRNA SPEC QL PROBE: NOT DETECTED

## 2021-04-13 ENCOUNTER — SOCIAL WORK (OUTPATIENT)
Dept: BEHAVIORAL/MENTAL HEALTH CLINIC | Facility: CLINIC | Age: 21
End: 2021-04-13
Payer: COMMERCIAL

## 2021-04-13 DIAGNOSIS — F41.1 GENERALIZED ANXIETY DISORDER: Primary | ICD-10-CM

## 2021-04-13 DIAGNOSIS — F40.10 SOCIAL ANXIETY DISORDER: ICD-10-CM

## 2021-04-13 PROCEDURE — 90834 PSYTX W PT 45 MINUTES: CPT | Performed by: SOCIAL WORKER

## 2021-04-13 NOTE — PSYCH
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  She discussed more in depth her anxiety  It interferes with riding in the car with her boyfriend and some other people, it interferes with school and participating, etc   Discussed the option of a med eval either by per PCP or by a psychiatrist   She recognized that she has not made good progress in tx but, questions herself of "maybe it is because she didn't try hard enough  "A:    Mild progress on goals  Appears she would benefit from a med eval   P:  To continues addressing tx plan goals  To think about a med eval and when willing then schedule an appt  Behavioral Health Treatment Plan [facility]: Diagnosis and Treatment Plan explained to Gearline Risk relates understanding diagnosis and is agreeable to Treatment Plan   Yes

## 2021-04-15 ENCOUNTER — TELEPHONE (OUTPATIENT)
Dept: PSYCHIATRY | Facility: CLINIC | Age: 21
End: 2021-04-15

## 2021-04-23 ENCOUNTER — TELEPHONE (OUTPATIENT)
Dept: PSYCHIATRY | Facility: CLINIC | Age: 21
End: 2021-04-23

## 2021-05-13 ENCOUNTER — TELEMEDICINE (OUTPATIENT)
Dept: BEHAVIORAL/MENTAL HEALTH CLINIC | Facility: CLINIC | Age: 21
End: 2021-05-13
Payer: COMMERCIAL

## 2021-05-13 DIAGNOSIS — F40.10 SOCIAL ANXIETY DISORDER: Primary | ICD-10-CM

## 2021-05-13 DIAGNOSIS — F41.1 GENERALIZED ANXIETY DISORDER: ICD-10-CM

## 2021-05-13 PROCEDURE — 90834 PSYTX W PT 45 MINUTES: CPT | Performed by: SOCIAL WORKER

## 2021-05-13 NOTE — PSYCH
Psychotherapy Provided: Individual Psychotherapy 50 minutes     Length of time in session: 50 minutes, follow up in 2 week    Encounter Diagnosis     ICD-10-CM    1  Social anxiety disorder  F40 10    2  Generalized anxiety disorder  F41 1        Goals addressed in session: Goal 1     Pain:      none    0    Current suicide risk : Low     D:  Met with Beth for session  The semester is over for college  She was able to do a lesson plan with the class virtually  When discussing it she would list off something good that she did but, would follow it up with a "but" which would negate the positive she just said  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  She is unsure if she wants to get it or not  In one of her classes she spontaneously participated  When discussing it she followed it up with a "but" which again negated the positive she had done  A:    Mild progress on goals  P:  To continues addressing tx plan goals  Behavioral Health Treatment Plan [facility]: Diagnosis and Treatment Plan explained to Ingrid Morse relates understanding diagnosis and is agreeable to Treatment Plan  Yes     Virtual Regular Visit      Assessment/Plan:    Problem List Items Addressed This Visit        Other    Social anxiety disorder - Primary    Generalized anxiety disorder          Goals addressed in session: Goal 1 and Goal 2          Reason for visit is No chief complaint on file  Encounter provider Leah Hunter    Provider located at [address] [place] [address] [facility] [address] [phone]      Recent Visits  No visits were found meeting these conditions     Showing recent visits within past 7 days and meeting all other requirements     Today's Visits  Date Type Provider Dept   05/13/21 [facility] Therapist [place]   Showing today's visits and meeting all other requirements     Future Appointments  No visits were found meeting these conditions  Showing future appointments within next 150 days and meeting all other requirements        The patient was identified by name and date of birth  Jeni Funezанна was informed that this is a telemedicine visit and that the visit is being conducted through [employer] and patient was informed that this is a secure, HIPAA-compliant platform  She agrees to proceed     My office door was closed  No one else was in the room  She acknowledged consent and understanding of privacy and security of the video platform  The patient has agreed to participate and understands they can discontinue the visit at any time  Patient is aware this is a billable service  Carron Curling is a 24 y o  female    HPI     No past medical history on file  Past Surgical History:   Procedure Laterality Date    WISDOM TOOTH EXTRACTION         Current Outpatient Medications   Medication Sig Dispense Refill    norgestimate-ethinyl estradiol (ORTHO-CYCLEN) 0 25-35 MG-MCG per tablet Take 1 tablet by mouth daily      Pramoxine HCl, Perianal, 1 % FOAM Apply 1 application topically daily as needed (rectal pain) 1 Can 0     No current facility-administered medications for this visit  No Known Allergies    Review of Systems    Video Exam    There were no vitals filed for this visit  Physical Exam     I spent 50 minutes directly with the patient during this visit      VIRTUAL VISIT DISCLAIMER    Jeni Arminанна acknowledges that she has consented to an online visit or consultation  She understands that the online visit is based solely on information provided by her, and that, in the absence of a face-to-face physical evaluation by the physician, the diagnosis she receives is both limited and provisional in terms of accuracy and completeness   This is not intended to replace a full medical face-to-face evaluation by the physician  Nguyễn August understands and accepts these terms

## 2021-05-27 ENCOUNTER — TELEMEDICINE (OUTPATIENT)
Dept: BEHAVIORAL/MENTAL HEALTH CLINIC | Facility: CLINIC | Age: 21
End: 2021-05-27
Payer: COMMERCIAL

## 2021-05-27 DIAGNOSIS — F40.10 SOCIAL ANXIETY DISORDER: ICD-10-CM

## 2021-05-27 DIAGNOSIS — F41.1 GENERALIZED ANXIETY DISORDER: Primary | ICD-10-CM

## 2021-05-27 PROCEDURE — 90834 PSYTX W PT 45 MINUTES: CPT | Performed by: SOCIAL WORKER

## 2021-05-27 NOTE — PSYCH
Psychotherapy Provided: Individual Psychotherapy 50 minutes     Length of time in session: 50 minutes, follow up in 2 week    Encounter Diagnosis     ICD-10-CM    1  Social anxiety disorder  F40 10    2  Generalized anxiety disorder  F41 1        Goals addressed in session: Goal 1     Pain:      none    0    Current suicide risk : Low     D:  Met with Beth for session  Continues to struggle a great deal with her anxiety and it is interfering with her daily living  "She is thinking about maybe seeing about med "    She described how COVID has made her anxiety worse by things previously being locked down and now starting to open and by 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, is afraid to get it  She discussed since last session how her anxiety interfered with her daily functioning  Her boyfriend gave her a CBD gummie one night which helped her sleep  Discussed JUSTIN does not know anything about CBD gummies but, did discuss a stress gummie that has herbs in that are researched base to help with anxiety  She would "rather try them then, think about meds "   She is leaving soon for a family trip to [place]  She is worried about the plan  A:    Min progress on goals  Beginning to gain insight into how debilitating her anxiety can be  Appears to continually be hesitant of meds  P:  To continues addressing tx plan goals  Behavioral Health Treatment Plan [facility]: Diagnosis and Treatment Plan explained to Ivette Louise relates understanding diagnosis and is agreeable to Treatment Plan   Yes

## 2021-06-10 ENCOUNTER — TELEMEDICINE (OUTPATIENT)
Dept: BEHAVIORAL/MENTAL HEALTH CLINIC | Facility: CLINIC | Age: 21
End: 2021-06-10
Payer: COMMERCIAL

## 2021-06-10 DIAGNOSIS — F41.1 GENERALIZED ANXIETY DISORDER: ICD-10-CM

## 2021-06-10 DIAGNOSIS — F40.10 SOCIAL ANXIETY DISORDER: Primary | ICD-10-CM

## 2021-06-10 PROCEDURE — 90834 PSYTX W PT 45 MINUTES: CPT | Performed by: SOCIAL WORKER

## 2021-06-10 NOTE — PSYCH
Psychotherapy Provided: Individual Psychotherapy 50 minutes     Length of time in session: 50 minutes, follow up in 2 week    Encounter Diagnosis     ICD-10-CM    1  Social anxiety disorder  F40 10    2  Generalized anxiety disorder  F41 1        Goals addressed in session: Goal 1     Pain:      none    0    Current suicide risk : Low     D:  Met with Beth for session  She went to [place] and got home this past weekend  Her step father and his 2 children ages 22 and 25 yrs old went also  She had a nice time  Discussed her step family  They have been a part of her life since she has been 10years old but, she "does not consider them family  They are her step family "  Her brother and her spent most of their time together with her step brother  She lives with her mother and step father but, has minimal conversations with him  She is respectful but, that is as far as it goes  She also "does not like her step sister" who is her age  A:    Min progress on goals  P:  To continues addressing tx plan goals  Behavioral Health Treatment Plan [facility]: Diagnosis and Treatment Plan explained to Andrade Suarez relates understanding diagnosis and is agreeable to Treatment Plan   Yes

## 2021-06-25 ENCOUNTER — TELEMEDICINE (OUTPATIENT)
Dept: BEHAVIORAL/MENTAL HEALTH CLINIC | Facility: CLINIC | Age: 21
End: 2021-06-25
Payer: COMMERCIAL

## 2021-06-25 DIAGNOSIS — F41.1 GENERALIZED ANXIETY DISORDER: Primary | ICD-10-CM

## 2021-06-25 DIAGNOSIS — F40.10 SOCIAL ANXIETY DISORDER: ICD-10-CM

## 2021-06-25 PROCEDURE — 90834 PSYTX W PT 45 MINUTES: CPT | Performed by: SOCIAL WORKER

## 2021-06-25 NOTE — PSYCH
Psychotherapy Provided: Individual Psychotherapy 50 minutes     Length of time in session: 50 minutes, follow up in 2 week    Encounter Diagnosis     ICD-10-CM    1  Social anxiety disorder  F40 10    2  Generalized anxiety disorder  F41 1        Goals addressed in session: Goal 1     Pain:      none    0    Current suicide risk : Low     D:  Met with Beth for session  Discussed issues with her boyfriend  They discussed taking a break due to neither were happy in the relationship  It is "for time to work on themselves "  Since last session she has been doing more such as going to the gym more, walking and went to the grocery store  She also got vitamins for anxiety to see if they will help  A:    Mild progress on goals  P:  To continues addressing tx plan goals  Behavioral Health Treatment Plan [facility]: Diagnosis and Treatment Plan explained to Yair Zeng relates understanding diagnosis and is agreeable to Treatment Plan   Yes

## 2021-07-07 ENCOUNTER — TELEPHONE (OUTPATIENT)
Dept: PSYCHIATRY | Facility: CLINIC | Age: 21
End: 2021-07-07

## 2021-07-07 NOTE — TELEPHONE ENCOUNTER
----- Message from Aydee Jacobs sent at 7/7/2021  1:51 PM EDT -----  Regarding: late cancellation  Patient called AT 13:50 to cx her 14:00 appt  No reason given  This appt will be marked as a no show for the late cancellation

## 2021-07-08 ENCOUNTER — TELEPHONE (OUTPATIENT)
Dept: BEHAVIORAL/MENTAL HEALTH CLINIC | Facility: CLINIC | Age: 21
End: 2021-07-08

## 2021-08-16 ENCOUNTER — OFFICE VISIT (OUTPATIENT)
Dept: INTERNAL MEDICINE CLINIC | Facility: CLINIC | Age: 21
End: 2021-08-16
Payer: COMMERCIAL

## 2021-08-16 ENCOUNTER — TELEPHONE (OUTPATIENT)
Dept: PSYCHIATRY | Facility: CLINIC | Age: 21
End: 2021-08-16

## 2021-08-16 VITALS
HEIGHT: 64 IN | SYSTOLIC BLOOD PRESSURE: 105 MMHG | OXYGEN SATURATION: 100 % | BODY MASS INDEX: 21 KG/M2 | TEMPERATURE: 97.9 F | HEART RATE: 69 BPM | DIASTOLIC BLOOD PRESSURE: 70 MMHG | WEIGHT: 123 LBS

## 2021-08-16 DIAGNOSIS — F41.1 GENERALIZED ANXIETY DISORDER: ICD-10-CM

## 2021-08-16 DIAGNOSIS — F41.9 ANXIETY: Primary | ICD-10-CM

## 2021-08-16 PROBLEM — Z12.4 SCREENING FOR CERVICAL CANCER: Status: ACTIVE | Noted: 2021-08-16

## 2021-08-16 PROCEDURE — 99214 OFFICE O/P EST MOD 30 MIN: CPT | Performed by: GENERAL ACUTE CARE HOSPITAL

## 2021-08-16 PROCEDURE — 1036F TOBACCO NON-USER: CPT | Performed by: GENERAL ACUTE CARE HOSPITAL

## 2021-08-16 PROCEDURE — 3725F SCREEN DEPRESSION PERFORMED: CPT | Performed by: GENERAL ACUTE CARE HOSPITAL

## 2021-08-16 PROCEDURE — 3008F BODY MASS INDEX DOCD: CPT | Performed by: GENERAL ACUTE CARE HOSPITAL

## 2021-08-16 RX ORDER — ESCITALOPRAM OXALATE 5 MG/1
5 TABLET ORAL DAILY
Qty: 60 TABLET | Refills: 1 | Status: SHIPPED | OUTPATIENT
Start: 2021-08-16 | End: 2021-10-07

## 2021-08-16 NOTE — TELEPHONE ENCOUNTER
----- Message from Brennon Ron sent at 8/16/2021  1:13 PM EDT -----  Regarding: Cancellation  Cx All: Patient contacted office and spoke with writer to cancel all appointments for therapy with JUSTIN Ibrahim, MAYRAW  Reason: No longer interested in services  Informed must go through intake if wants to reestablish care

## 2021-08-16 NOTE — ASSESSMENT & PLAN NOTE
HAILE-7 :18  Severe anxiety  PHQ-2:2  Recommend starting medication in combination with therapy  Counseling provided regarding following: Will start medication for anxiety  In the first month, please watch any side effects that are mentioned below  Most of the side effect are temporary and will pass after the first month  You may feel a little worse in the first month before you start to feel better  Most of these mediations start to take effect in 4-6 weeks  Drugs of the SSRI class can have side effects such as dry mouth, blurred vision, trouble urinating, constipation, dizziness, drowsiness, trouble sleeping, jitteriness, nausea, vomiting, diarrhea, weight gain, sexual problems  These medications are generally effective at alleviating symptoms of anxiety and/or depression  Let me know if significant side effects do occur  Pt has many questions regarding meds  All answered  F/u in a month

## 2021-08-16 NOTE — PROGRESS NOTES
Assessment/Plan:    Generalized anxiety disorder  HAILE-7 :18  Severe anxiety  PHQ-2:2  Recommend starting medication in combination with therapy  Counseling provided regarding following: Will start medication for anxiety  In the first month, please watch any side effects that are mentioned below  Most of the side effect are temporary and will pass after the first month  You may feel a little worse in the first month before you start to feel better  Most of these mediations start to take effect in 4-6 weeks  Drugs of the SSRI class can have side effects such as dry mouth, blurred vision, trouble urinating, constipation, dizziness, drowsiness, trouble sleeping, jitteriness, nausea, vomiting, diarrhea, weight gain, sexual problems  These medications are generally effective at alleviating symptoms of anxiety and/or depression  Let me know if significant side effects do occur  Pt has many questions regarding meds  All answered  F/u in a month  I have spent 30 minutes with Patient  today in which greater than 50% of this time was spent in counseling/coordination of care regarding Diagnostic results, Prognosis, Risks and benefits of tx options, Intructions for management, Importance of tx compliance and Impressions  Diagnoses and all orders for this visit:    Anxiety  -     escitalopram (LEXAPRO) 5 mg tablet; Take 1 tablet (5 mg total) by mouth daily  -     TSH, 3rd generation with Free T4 reflex; Future    Generalized anxiety disorder    Other orders  -     Cancel: Hepatitis C Antibody ([facility]); Future  -     Cancel: HIV 1/2 Antigen/Antibody (4th Generation) w Reflex [facility]; Future  -     Cancel: Chlamydia/GC amplified DNA by PCR; Future  -     Cancel: TDAP VACCINE GREATER THAN OR EQUAL TO 8YO IM  -     Cancel: Ambulatory referral to Obstetrics / Gynecology; Future          Subjective:      Patient ID: Juventino Cowden is a 24 y o  female  HPI    Anxiety  Getting worse   Seeing a therapist feels did not help much  Switched to a new therapist    HAILE-7: 18  Struggling in school  Struggling going to grocery shopping, meeting friends  Social anxiety  Just ended a toxic relationship  Has one more year of college but thinking of putting off going back to school due to mental issue  Denies SI  Lots of stress she is dealing with  Denies smoking, using substance  Alcohol socially  The following portions of the patient's history were reviewed and updated as appropriate: allergies, past family history, past social history and past surgical history      Review of Systems      Objective:      /70   Pulse 69   Temp 97 9 °F (36 6 °C) (Temporal)   Ht 5' 4" (1 626 m)   Wt 55 8 kg (123 lb)   SpO2 100%   BMI 21 11 kg/m²          Physical Exam

## 2021-08-16 NOTE — PATIENT INSTRUCTIONS
Will start medication for anxiety  In the first month, please watch any side effects that are mentioned below  Most of the side effect are temporary and will pass after the first month  You may feel a little worse in the first month before you start to feel better  Most of these mediations start to take peak effect in 4-6 weeks  Drugs of the SSRI class can have side effects such as dry mouth, blurred vision, trouble urinating, constipation, dizziness, drowsiness, trouble sleeping, jitteriness, nausea, vomiting, diarrhea, weight gain, sexual problems  These medications are generally effective at alleviating symptoms of anxiety and/or depression  Let me know if significant side effects do occur

## 2021-08-30 ENCOUNTER — DOCUMENTATION (OUTPATIENT)
Dept: BEHAVIORAL/MENTAL HEALTH CLINIC | Facility: CLINIC | Age: 21
End: 2021-08-30

## 2021-08-30 NOTE — PROGRESS NOTES
Assessment/Plan:      anxiety dis order    Subjective:     Patient ID: Carito Steele is a 24 y o  female  Outpatient Discharge Summary:   Admission Date: 12/17/20  Martha Azevedo was referred by PCP  Discharge Date: 8/16/21    Discharge Diagnosis:    Social anxiety  HAILE    Treating Physician:  Dr Nando Olivarez  Treatment Complications: Pt called  He cx all pending sessions due to "no longer interested in services    Presenting Problem: anxiety and school stress, has had anxiety her "whole life "  Course of treatment includes:    individual therapy   Treatment Progress: fair  Criteria for Discharge: demonstrated failure to uphold their treatment plan/contract  Aftercare recommendations include follow up if needed  Discharge Medications include:  Current Outpatient Medications:     escitalopram (LEXAPRO) 5 mg tablet, Take 1 tablet (5 mg total) by mouth daily, Disp: 60 tablet, Rfl: 1    norgestimate-ethinyl estradiol (ORTHO-CYCLEN) 0 25-35 MG-MCG per tablet, Take 1 tablet by mouth daily, Disp: , Rfl:     Prognosis: fair

## 2021-08-31 ENCOUNTER — TELEPHONE (OUTPATIENT)
Dept: INTERNAL MEDICINE CLINIC | Facility: CLINIC | Age: 21
End: 2021-08-31

## 2021-08-31 NOTE — TELEPHONE ENCOUNTER
Patient needs a TB test for school  Patient would like to schedule as soon as possible        Please advise

## 2021-09-01 ENCOUNTER — DOCUMENTATION (OUTPATIENT)
Dept: BEHAVIORAL/MENTAL HEALTH CLINIC | Facility: CLINIC | Age: 21
End: 2021-09-01

## 2021-09-01 NOTE — PROGRESS NOTES
[facility]    Patient Name Francisco Champion     Date of Birth: 24 y o  2000      MRN: [id number]    Admission Date: {PSYCH Transfer Admission IOQI:17376}    Date of Transfer: 2021    Admission Diagnosis:     1  {PSYCH Diagnosis:64525}    Current Diagnosis:     No diagnosis found  Reason for Admission: Dougie Manley presented for treatment due to {PSYCH HPI Choices:64832}  Primary complaints included {PSYCH Psychiatric Symptomatology:83266}  ***    Progress in Treatment: Dougie Manley was seen for {PSYCH Course of Treatment:91187}  During the course of treatment she ***  Episodes of Higher Level of Care: {PSYCH Higher Level of IFJC:75121}    Transfer request Initiated by: Psychiatrist: {YOANA Psychiatrist PGKUS:[id number]} Therapist: {AMB Therapist HVDIA:85193}    Reason for Transfer Request: {PSYCH Reason for Transfer Request:75083}    Does this individual need a clinician with specialized training/expertise?: {PSYCH Clinician Specialized Trainin::"No"}    Is this client working with any other [facility] Providers/Therapists?  Psychiatrist: {YOANA Psychiatrist DMKIF:03282} Therapist: {AMB Therapist [id number]    Other pertinent issues: {PSYCH Other Pertinent Issues:95222::"None"}    Are there any specific individuals who would be a best fit or who have already agreed to accept this transfer request?      Psychiatrist: {YOANA Psychiatrist Names:97205}   Therapist: {AMB Therapist Names:35765}  Rationale: {PSYCH Rationale for Transfer TKWZPW:65425::"LKG Applicable"}    Attempts to maintain the current therapeutic relationship: {PSYCH Attempts to Maintain Therapy:95735}    Transfer request routed to {PSYCH Transfer Request Routed:08385} for input and/or approval      {Comments from other involved providers and/or clinical coordinator (Optional):49428}    Servando Thomas21

## 2021-09-22 ENCOUNTER — OFFICE VISIT (OUTPATIENT)
Dept: INTERNAL MEDICINE CLINIC | Facility: CLINIC | Age: 21
End: 2021-09-22
Payer: COMMERCIAL

## 2021-09-22 VITALS
HEIGHT: 64 IN | TEMPERATURE: 97.1 F | SYSTOLIC BLOOD PRESSURE: 100 MMHG | HEART RATE: 56 BPM | OXYGEN SATURATION: 98 % | DIASTOLIC BLOOD PRESSURE: 70 MMHG | BODY MASS INDEX: 20.49 KG/M2 | WEIGHT: 120 LBS

## 2021-09-22 DIAGNOSIS — F41.1 GENERALIZED ANXIETY DISORDER: Primary | ICD-10-CM

## 2021-09-22 PROCEDURE — 99213 OFFICE O/P EST LOW 20 MIN: CPT | Performed by: GENERAL ACUTE CARE HOSPITAL

## 2021-09-22 PROCEDURE — 1036F TOBACCO NON-USER: CPT | Performed by: GENERAL ACUTE CARE HOSPITAL

## 2021-09-22 PROCEDURE — 3008F BODY MASS INDEX DOCD: CPT | Performed by: GENERAL ACUTE CARE HOSPITAL

## 2021-09-22 NOTE — PROGRESS NOTES
Assessment/Plan:    Generalized anxiety disorder  Much improved with the help of medication therapy and the stress reduction  She feels well and comfortable on the current dose  She is tolerating the medication well  Continue current dose  We discussed that if she goes back to school next January did not diet he may get worse  She can consider increase Lexapro to 10 mg a month prior to new semester starts  Call back with questions  Diagnoses and all orders for this visit:    Generalized anxiety disorder    Other orders  -     Cancel: Hepatitis C Antibody ([facility]); Future  -     Cancel: HIV 1/2 Antigen/Antibody (4th Generation) w Reflex [facility]; Future  -     Cancel: Chlamydia/GC amplified DNA by PCR; Future  -     Cancel: TDAP VACCINE GREATER THAN OR EQUAL TO 8YO IM  -     Cancel: Ambulatory referral to Obstetrics / Gynecology; Future          Subjective:      Patient ID: Juventino Cowden is a 24 y o  female  HPI    On lexapro 5mg  tolerating well  Decided to take the semester off  Feels better  Seeing a new therapist    Anxiety is much better  Seeing friends, participating in activities  The following portions of the patient's history were reviewed and updated as appropriate: allergies, past family history, past social history and past surgical history      Review of Systems      Objective:      /70   Pulse 56   Temp (!) 97 1 °F (36 2 °C) (Temporal)   Ht 5' 4" (1 626 m)   Wt 54 4 kg (120 lb)   SpO2 98%   BMI 20 60 kg/m²          Physical Exam

## 2021-09-22 NOTE — ASSESSMENT & PLAN NOTE
Much improved with the help of medication therapy and the stress reduction  She feels well and comfortable on the current dose  She is tolerating the medication well  Continue current dose  We discussed that if she goes back to school next January did not diet he may get worse  She can consider increase Lexapro to 10 mg a month prior to new semester starts  Call back with questions

## 2021-09-27 ENCOUNTER — APPOINTMENT (OUTPATIENT)
Dept: LAB | Facility: CLINIC | Age: 21
End: 2021-09-27
Payer: COMMERCIAL

## 2021-09-27 DIAGNOSIS — Z11.4 SCREENING FOR HIV (HUMAN IMMUNODEFICIENCY VIRUS): ICD-10-CM

## 2021-09-27 DIAGNOSIS — F41.9 ANXIETY: ICD-10-CM

## 2021-09-27 DIAGNOSIS — Z11.59 NEED FOR HEPATITIS C SCREENING TEST: ICD-10-CM

## 2021-09-27 LAB
HCV AB SER QL: NORMAL
TSH SERPL DL<=0.05 MIU/L-ACNC: 1.75 UIU/ML (ref 0.36–3.74)

## 2021-09-27 PROCEDURE — 36415 COLL VENOUS BLD VENIPUNCTURE: CPT

## 2021-09-27 PROCEDURE — 84443 ASSAY THYROID STIM HORMONE: CPT

## 2021-09-27 PROCEDURE — 87389 HIV-1 AG W/HIV-1&-2 AB AG IA: CPT

## 2021-09-27 PROCEDURE — 86803 HEPATITIS C AB TEST: CPT

## 2021-09-28 LAB — HIV 1+2 AB+HIV1 P24 AG SERPL QL IA: NORMAL

## 2021-10-07 DIAGNOSIS — F41.9 ANXIETY: ICD-10-CM

## 2021-10-07 RX ORDER — ESCITALOPRAM OXALATE 5 MG/1
TABLET ORAL
Qty: 90 TABLET | Refills: 1 | Status: SHIPPED | OUTPATIENT
Start: 2021-10-07 | End: 2021-10-28

## 2021-10-28 DIAGNOSIS — F41.1 GENERALIZED ANXIETY DISORDER: Primary | ICD-10-CM

## 2021-10-28 RX ORDER — ESCITALOPRAM OXALATE 10 MG/1
10 TABLET ORAL DAILY
Qty: 30 TABLET | Refills: 5 | Status: SHIPPED | OUTPATIENT
Start: 2021-10-28 | End: 2021-11-10

## 2021-11-10 DIAGNOSIS — F41.1 GENERALIZED ANXIETY DISORDER: ICD-10-CM

## 2021-11-10 RX ORDER — ESCITALOPRAM OXALATE 10 MG/1
TABLET ORAL
Qty: 90 TABLET | Refills: 2 | Status: SHIPPED | OUTPATIENT
Start: 2021-11-10 | End: 2021-12-16 | Stop reason: SDUPTHER

## 2021-12-16 DIAGNOSIS — F41.1 GENERALIZED ANXIETY DISORDER: ICD-10-CM

## 2021-12-16 RX ORDER — ESCITALOPRAM OXALATE 10 MG/1
10 TABLET ORAL DAILY
Qty: 90 TABLET | Refills: 1 | Status: SHIPPED | OUTPATIENT
Start: 2021-12-16 | End: 2022-06-14

## 2021-12-30 ENCOUNTER — TELEPHONE (OUTPATIENT)
Dept: ADMINISTRATIVE | Facility: OTHER | Age: 21
End: 2021-12-30

## 2021-12-30 ENCOUNTER — RA CDI HCC (OUTPATIENT)
Dept: OTHER | Facility: HOSPITAL | Age: 21
End: 2021-12-30

## 2022-01-03 ENCOUNTER — TELEPHONE (OUTPATIENT)
Dept: INTERNAL MEDICINE CLINIC | Facility: CLINIC | Age: 22
End: 2022-01-03

## 2022-01-03 NOTE — TELEPHONE ENCOUNTER
Patient scheduled for a physical for 1/5  She is not having any symptoms but she was covid positive at [facility] 12/25/21 and she is vaccinated  Would it be ok for her to come in for her visit?

## 2022-01-05 ENCOUNTER — OFFICE VISIT (OUTPATIENT)
Dept: INTERNAL MEDICINE CLINIC | Facility: CLINIC | Age: 22
End: 2022-01-05
Payer: COMMERCIAL

## 2022-01-05 VITALS
OXYGEN SATURATION: 99 % | BODY MASS INDEX: 21.85 KG/M2 | HEART RATE: 70 BPM | SYSTOLIC BLOOD PRESSURE: 98 MMHG | TEMPERATURE: 96.6 F | HEIGHT: 64 IN | WEIGHT: 128 LBS | DIASTOLIC BLOOD PRESSURE: 62 MMHG

## 2022-01-05 DIAGNOSIS — Z11.1 TUBERCULOSIS SCREENING: ICD-10-CM

## 2022-01-05 DIAGNOSIS — F40.10 SOCIAL ANXIETY DISORDER: ICD-10-CM

## 2022-01-05 DIAGNOSIS — F41.1 GENERALIZED ANXIETY DISORDER: ICD-10-CM

## 2022-01-05 DIAGNOSIS — Z23 ENCOUNTER FOR IMMUNIZATION: Primary | ICD-10-CM

## 2022-01-05 PROCEDURE — 99213 OFFICE O/P EST LOW 20 MIN: CPT | Performed by: GENERAL ACUTE CARE HOSPITAL

## 2022-01-05 PROCEDURE — 86580 TB INTRADERMAL TEST: CPT

## 2022-01-05 PROCEDURE — 3008F BODY MASS INDEX DOCD: CPT | Performed by: GENERAL ACUTE CARE HOSPITAL

## 2022-01-05 PROCEDURE — 1036F TOBACCO NON-USER: CPT | Performed by: GENERAL ACUTE CARE HOSPITAL

## 2022-01-06 NOTE — PROGRESS NOTES
Assessment/Plan:    Generalized anxiety disorder  Improved  lexapro increased to 10mg now  Continue  Tuberculosis screening  Ppd placed       Diagnoses and all orders for this visit:    Encounter for immunization  -     TB Skin Test    Social anxiety disorder    Generalized anxiety disorder    Tuberculosis screening          Subjective:      Patient ID: Lizbeth Cabral is a 24 y o  female  HPI    Anxiety improving  Feels much better now  Has school physical form, needs ppd  The following portions of the patient's history were reviewed and updated as appropriate: allergies, past family history, past social history and past surgical history  Review of Systems   Constitutional: Negative for chills, fatigue and fever  HENT: Negative for congestion, nosebleeds, postnasal drip, sneezing, sore throat and trouble swallowing  Eyes: Negative for pain  Respiratory: Negative for cough, chest tightness, shortness of breath and wheezing  Cardiovascular: Negative for chest pain, palpitations and leg swelling  Gastrointestinal: Negative for abdominal pain, constipation, diarrhea, nausea and vomiting  Endocrine: Negative for cold intolerance, heat intolerance, polydipsia and polyuria  Genitourinary: Negative for difficulty urinating, dysuria, flank pain and hematuria  Musculoskeletal: Negative for arthralgias and myalgias  Skin: Negative for rash  Neurological: Negative for dizziness, tremors, weakness and headaches  Hematological: Does not bruise/bleed easily  Psychiatric/Behavioral: Negative for confusion and dysphoric mood  The patient is not nervous/anxious  Objective:      BP 98/62 (BP Location: Left arm, Patient Position: Sitting, Cuff Size: Standard)   Pulse 70   Temp (!) 96 6 °F (35 9 °C)   Ht 5' 4" (1 626 m)   Wt 58 1 kg (128 lb)   SpO2 99%   BMI 21 97 kg/m²          Physical Exam  Constitutional:       General: She is not in acute distress       Appearance: She is well-developed  HENT:      Head: Normocephalic and atraumatic  Right Ear: External ear normal       Left Ear: External ear normal    Eyes:      General: No scleral icterus  Conjunctiva/sclera: Conjunctivae normal    Neck:      Thyroid: No thyromegaly  Trachea: No tracheal deviation  Cardiovascular:      Rate and Rhythm: Normal rate and regular rhythm  Heart sounds: Normal heart sounds  Pulmonary:      Effort: Pulmonary effort is normal  No respiratory distress  Breath sounds: Normal breath sounds  No wheezing or rales  Abdominal:      General: Bowel sounds are normal       Palpations: Abdomen is soft  Tenderness: There is no abdominal tenderness  There is no guarding or rebound  Musculoskeletal:      Cervical back: Normal range of motion and neck supple  Lymphadenopathy:      Cervical: No cervical adenopathy  Neurological:      Mental Status: She is alert and oriented to person, place, and time  Psychiatric:         Behavior: Behavior normal          Thought Content:  Thought content normal          Judgment: Judgment normal

## 2022-01-07 ENCOUNTER — CLINICAL SUPPORT (OUTPATIENT)
Dept: INTERNAL MEDICINE CLINIC | Facility: CLINIC | Age: 22
End: 2022-01-07

## 2022-01-07 DIAGNOSIS — Z11.1 ENCOUNTER FOR PPD SKIN TEST READING: Primary | ICD-10-CM

## 2022-01-07 LAB
INDURATION: 0 MM
TB SKIN TEST: NEGATIVE

## 2022-06-14 DIAGNOSIS — F41.1 GENERALIZED ANXIETY DISORDER: ICD-10-CM

## 2022-06-14 RX ORDER — ESCITALOPRAM OXALATE 10 MG/1
TABLET ORAL
Qty: 90 TABLET | Refills: 1 | Status: SHIPPED | OUTPATIENT
Start: 2022-06-14

## 2022-10-12 PROBLEM — Z12.4 SCREENING FOR CERVICAL CANCER: Status: RESOLVED | Noted: 2021-08-16 | Resolved: 2022-10-12

## 2022-10-12 PROBLEM — Z11.1 TUBERCULOSIS SCREENING: Status: RESOLVED | Noted: 2022-01-05 | Resolved: 2022-10-12

## 2022-10-12 PROBLEM — Z11.59 NEED FOR HEPATITIS C SCREENING TEST: Status: RESOLVED | Noted: 2021-01-18 | Resolved: 2022-10-12

## 2022-11-28 ENCOUNTER — TELEPHONE (OUTPATIENT)
Dept: INTERNAL MEDICINE CLINIC | Facility: CLINIC | Age: 22
End: 2022-11-28

## 2022-12-08 DIAGNOSIS — F41.1 GENERALIZED ANXIETY DISORDER: ICD-10-CM

## 2022-12-13 ENCOUNTER — TELEPHONE (OUTPATIENT)
Dept: OTHER | Facility: OTHER | Age: 22
End: 2022-12-13

## 2022-12-13 DIAGNOSIS — F41.1 GENERALIZED ANXIETY DISORDER: ICD-10-CM

## 2022-12-13 RX ORDER — ESCITALOPRAM OXALATE 10 MG/1
TABLET ORAL
Qty: 90 TABLET | Refills: 1 | Status: SHIPPED | OUTPATIENT
Start: 2022-12-13 | End: 2022-12-13 | Stop reason: SDUPTHER

## 2022-12-13 NOTE — TELEPHONE ENCOUNTER
Pt calling to get refill of Lexapro and pharmacy stated prescription was discontinued in Sept 2022  Patient is requesting a new prescription be written   Please call patient back to discuss

## 2022-12-14 RX ORDER — ESCITALOPRAM OXALATE 10 MG/1
10 TABLET ORAL DAILY
Qty: 90 TABLET | Refills: 0 | Status: SHIPPED | OUTPATIENT
Start: 2022-12-14

## 2023-02-15 ENCOUNTER — OFFICE VISIT (OUTPATIENT)
Dept: INTERNAL MEDICINE CLINIC | Facility: CLINIC | Age: 23
End: 2023-02-15

## 2023-02-15 VITALS
BODY MASS INDEX: 24.34 KG/M2 | WEIGHT: 142.6 LBS | DIASTOLIC BLOOD PRESSURE: 60 MMHG | OXYGEN SATURATION: 99 % | HEART RATE: 55 BPM | HEIGHT: 64 IN | SYSTOLIC BLOOD PRESSURE: 112 MMHG

## 2023-02-15 DIAGNOSIS — F41.1 GENERALIZED ANXIETY DISORDER: ICD-10-CM

## 2023-02-15 DIAGNOSIS — Z00.00 PHYSICAL EXAM: Primary | ICD-10-CM

## 2023-02-15 DIAGNOSIS — Z23 ENCOUNTER FOR IMMUNIZATION: ICD-10-CM

## 2023-02-15 DIAGNOSIS — Z00.00 ANNUAL PHYSICAL EXAM: ICD-10-CM

## 2023-02-15 PROBLEM — R19.09 GROIN MASS IN FEMALE: Status: RESOLVED | Noted: 2021-01-18 | Resolved: 2023-02-15

## 2023-02-15 PROBLEM — K62.89 RECTAL DISCOMFORT: Status: RESOLVED | Noted: 2020-08-31 | Resolved: 2023-02-15

## 2023-02-15 RX ORDER — ESCITALOPRAM OXALATE 10 MG/1
10 TABLET ORAL DAILY
Qty: 90 TABLET | Refills: 2 | Status: SHIPPED | OUTPATIENT
Start: 2023-02-15

## 2023-02-16 NOTE — ASSESSMENT & PLAN NOTE
Routine blood works:ordered  Vaccines:reviewed  Need tdap  Cervical Cancer screen:up to date     Depression screening:neg  Healthy diet and regular exercise

## 2023-02-16 NOTE — PROGRESS NOTES
Name: Yaneth Bean      : 2000      MRN: [id number]  Encounter Provider: Mariam Meyers MD  Encounter Date: 2/15/2023   [facility]    Assessment & Plan     1  Physical exam  -     CBC and differential; Future  -     Basic metabolic panel; Future  -     TSH, 3rd generation with Free T4 reflex; Future  -     TDAP VACCINE GREATER THAN OR EQUAL TO 6YO IM    2  Encounter for immunization    3  Generalized anxiety disorder  Assessment & Plan:  Very well controlled   Continue lexapro    Orders:  -     escitalopram (LEXAPRO) 10 mg tablet; Take 1 tablet (10 mg total) by mouth daily    4  Annual physical exam  Assessment & Plan:  Routine blood works:ordered  Vaccines:reviewed  Need tdap  Cervical Cancer screen:up to date     Depression screening:neg  Healthy diet and regular exercise           Subjective      HPI   Doing well in general   Very happy with the effect of lexapro  No major compain  Review of Systems    Current Outpatient Medications on File Prior to Visit   Medication Sig   • norgestimate-ethinyl estradiol (ORTHO-CYCLEN) 0 25-35 MG-MCG per tablet Take 1 tablet by mouth daily   • [DISCONTINUED] escitalopram (LEXAPRO) 10 mg tablet Take 1 tablet (10 mg total) by mouth daily       Objective     /60 (BP Location: Left arm, Patient Position: Sitting, Cuff Size: Standard)   Pulse 55   Ht 5' 4" (1 626 m)   Wt 64 7 kg (142 lb 9 6 oz)   SpO2 99%   BMI 24 48 kg/m²     Physical Exam  Mariam Meyers MD good, to achieve stated therapy goals

## 2023-03-14 ENCOUNTER — HOSPITAL ENCOUNTER (EMERGENCY)
Facility: HOSPITAL | Age: 23
Discharge: HOME | End: 2023-03-14
Attending: EMERGENCY MEDICINE

## 2023-03-14 ENCOUNTER — APPOINTMENT (EMERGENCY)
Dept: RADIOLOGY | Facility: HOSPITAL | Age: 23
End: 2023-03-14

## 2023-03-14 ENCOUNTER — NURSE TRIAGE (OUTPATIENT)
Dept: OTHER | Facility: OTHER | Age: 23
End: 2023-03-14

## 2023-03-14 VITALS
BODY MASS INDEX: 23.04 KG/M2 | HEIGHT: 63 IN | HEART RATE: 64 BPM | SYSTOLIC BLOOD PRESSURE: 101 MMHG | OXYGEN SATURATION: 100 % | WEIGHT: 130 LBS | TEMPERATURE: 97.6 F | DIASTOLIC BLOOD PRESSURE: 54 MMHG | RESPIRATION RATE: 16 BRPM

## 2023-03-14 DIAGNOSIS — R11.2 NAUSEA & VOMITING: ICD-10-CM

## 2023-03-14 DIAGNOSIS — E87.6 HYPOKALEMIA: ICD-10-CM

## 2023-03-14 DIAGNOSIS — K52.9 GASTROENTERITIS: Primary | ICD-10-CM

## 2023-03-14 LAB
ALBUMIN SERPL BCP-MCNC: 4.5 G/DL (ref 3.5–5)
ALP SERPL-CCNC: 48 U/L (ref 34–104)
ALT SERPL W P-5'-P-CCNC: 15 U/L (ref 7–52)
ANION GAP SERPL CALCULATED.3IONS-SCNC: 15 MMOL/L (ref 4–13)
ANION GAP SERPL CALCULATED.3IONS-SCNC: 6 MMOL/L (ref 4–13)
APTT PPP: 25 SECONDS (ref 23–37)
AST SERPL W P-5'-P-CCNC: 21 U/L (ref 13–39)
BACTERIA UR QL AUTO: ABNORMAL /HPF
BASOPHILS # BLD AUTO: 0.06 THOUSANDS/ÂΜL (ref 0–0.1)
BASOPHILS NFR BLD AUTO: 0 % (ref 0–1)
BILIRUB SERPL-MCNC: 0.78 MG/DL (ref 0.2–1)
BILIRUB UR QL STRIP: NEGATIVE
BUN SERPL-MCNC: 11 MG/DL (ref 5–25)
BUN SERPL-MCNC: 15 MG/DL (ref 5–25)
CALCIUM SERPL-MCNC: 6.1 MG/DL (ref 8.4–10.2)
CALCIUM SERPL-MCNC: 9.6 MG/DL (ref 8.4–10.2)
CHLORIDE SERPL-SCNC: 106 MMOL/L (ref 96–108)
CHLORIDE SERPL-SCNC: 120 MMOL/L (ref 96–108)
CLARITY UR: ABNORMAL
CO2 SERPL-SCNC: 16 MMOL/L (ref 21–32)
CO2 SERPL-SCNC: 18 MMOL/L (ref 21–32)
COLOR UR: YELLOW
CREAT SERPL-MCNC: 0.68 MG/DL (ref 0.6–1.3)
CREAT SERPL-MCNC: 1.51 MG/DL (ref 0.6–1.3)
EOSINOPHIL # BLD AUTO: 0.02 THOUSAND/ÂΜL (ref 0–0.61)
EOSINOPHIL NFR BLD AUTO: 0 % (ref 0–6)
ERYTHROCYTE [DISTWIDTH] IN BLOOD BY AUTOMATED COUNT: 12.7 % (ref 11.6–15.1)
GFR SERPL CREATININE-BSD FRML MDRD: 122 ML/MIN/1.73SQ M
GFR SERPL CREATININE-BSD FRML MDRD: 48 ML/MIN/1.73SQ M
GLUCOSE SERPL-MCNC: 105 MG/DL (ref 65–140)
GLUCOSE SERPL-MCNC: 145 MG/DL (ref 65–140)
GLUCOSE UR STRIP-MCNC: NEGATIVE MG/DL
HCG SERPL QL: NEGATIVE
HCT VFR BLD AUTO: 45.8 % (ref 34.8–46.1)
HGB BLD-MCNC: 14.9 G/DL (ref 11.5–15.4)
HGB UR QL STRIP.AUTO: ABNORMAL
HYALINE CASTS #/AREA URNS LPF: ABNORMAL /LPF
IMM GRANULOCYTES # BLD AUTO: 0.07 THOUSAND/UL (ref 0–0.2)
IMM GRANULOCYTES NFR BLD AUTO: 0 % (ref 0–2)
INR PPP: 1.1 (ref 0.84–1.19)
KETONES UR STRIP-MCNC: ABNORMAL MG/DL
LEUKOCYTE ESTERASE UR QL STRIP: ABNORMAL
LIPASE SERPL-CCNC: 23 U/L (ref 11–82)
LYMPHOCYTES # BLD AUTO: 0.44 THOUSANDS/ÂΜL (ref 0.6–4.47)
LYMPHOCYTES NFR BLD AUTO: 3 % (ref 14–44)
MCH RBC QN AUTO: 30.3 PG (ref 26.8–34.3)
MCHC RBC AUTO-ENTMCNC: 32.5 G/DL (ref 31.4–37.4)
MCV RBC AUTO: 93 FL (ref 82–98)
MONOCYTES # BLD AUTO: 0.99 THOUSAND/ÂΜL (ref 0.17–1.22)
MONOCYTES NFR BLD AUTO: 6 % (ref 4–12)
MUCOUS THREADS UR QL AUTO: ABNORMAL
NEUTROPHILS # BLD AUTO: 14.81 THOUSANDS/ÂΜL (ref 1.85–7.62)
NEUTS SEG NFR BLD AUTO: 91 % (ref 43–75)
NITRITE UR QL STRIP: NEGATIVE
NON-SQ EPI CELLS URNS QL MICRO: ABNORMAL /HPF
NRBC BLD AUTO-RTO: 0 /100 WBCS
PH UR STRIP.AUTO: 5.5 [PH]
PLATELET # BLD AUTO: 335 THOUSANDS/UL (ref 149–390)
PMV BLD AUTO: 10.7 FL (ref 8.9–12.7)
POTASSIUM SERPL-SCNC: 3.3 MMOL/L (ref 3.5–5.3)
POTASSIUM SERPL-SCNC: 3.9 MMOL/L (ref 3.5–5.3)
PROT SERPL-MCNC: 7.6 G/DL (ref 6.4–8.4)
PROT UR STRIP-MCNC: ABNORMAL MG/DL
PROTHROMBIN TIME: 14.4 SECONDS (ref 11.6–14.5)
RBC # BLD AUTO: 4.91 MILLION/UL (ref 3.81–5.12)
RBC #/AREA URNS AUTO: ABNORMAL /HPF
SODIUM SERPL-SCNC: 139 MMOL/L (ref 135–147)
SODIUM SERPL-SCNC: 142 MMOL/L (ref 135–147)
SP GR UR STRIP.AUTO: 1.02 (ref 1–1.03)
UROBILINOGEN UR STRIP-ACNC: <2 MG/DL
WBC # BLD AUTO: 16.39 THOUSAND/UL (ref 4.31–10.16)
WBC #/AREA URNS AUTO: ABNORMAL /HPF

## 2023-03-14 RX ORDER — FAMOTIDINE 20 MG/1
20 TABLET, FILM COATED ORAL DAILY
Qty: 3 TABLET | Refills: 0 | Status: SHIPPED | OUTPATIENT
Start: 2023-03-14 | End: 2023-03-17

## 2023-03-14 RX ORDER — AZITHROMYCIN 500 MG/1
500 TABLET, FILM COATED ORAL EVERY 24 HOURS
Qty: 4 TABLET | Refills: 0 | Status: SHIPPED | OUTPATIENT
Start: 2023-03-14 | End: 2023-03-18

## 2023-03-14 RX ORDER — HYDROCORTISONE ACETATE 25 MG/1
25 SUPPOSITORY RECTAL 2 TIMES DAILY
Qty: 6 SUPPOSITORY | Refills: 0 | Status: SHIPPED | OUTPATIENT
Start: 2023-03-14 | End: 2023-03-17

## 2023-03-14 RX ORDER — HYDROCORTISONE ACETATE 25 MG/1
25 SUPPOSITORY RECTAL 2 TIMES DAILY
Status: DISCONTINUED | OUTPATIENT
Start: 2023-03-14 | End: 2023-03-14 | Stop reason: HOSPADM

## 2023-03-14 RX ORDER — ONDANSETRON 4 MG/1
4 TABLET, FILM COATED ORAL EVERY 8 HOURS PRN
Qty: 20 TABLET | Refills: 0 | Status: SHIPPED | OUTPATIENT
Start: 2023-03-14

## 2023-03-14 RX ORDER — AZITHROMYCIN 250 MG/1
500 TABLET, FILM COATED ORAL ONCE
Status: COMPLETED | OUTPATIENT
Start: 2023-03-14 | End: 2023-03-14

## 2023-03-14 RX ORDER — FAMOTIDINE 10 MG/ML
20 INJECTION, SOLUTION INTRAVENOUS ONCE
Status: COMPLETED | OUTPATIENT
Start: 2023-03-14 | End: 2023-03-14

## 2023-03-14 RX ORDER — ONDANSETRON 2 MG/ML
4 INJECTION INTRAMUSCULAR; INTRAVENOUS ONCE
Status: COMPLETED | OUTPATIENT
Start: 2023-03-14 | End: 2023-03-14

## 2023-03-14 RX ORDER — SODIUM CHLORIDE, SODIUM GLUCONATE, SODIUM ACETATE, POTASSIUM CHLORIDE, MAGNESIUM CHLORIDE, SODIUM PHOSPHATE, DIBASIC, AND POTASSIUM PHOSPHATE .53; .5; .37; .037; .03; .012; .00082 G/100ML; G/100ML; G/100ML; G/100ML; G/100ML; G/100ML; G/100ML
1000 INJECTION, SOLUTION INTRAVENOUS ONCE
Status: DISCONTINUED | OUTPATIENT
Start: 2023-03-14 | End: 2023-03-14

## 2023-03-14 RX ADMIN — ONDANSETRON 4 MG: 2 INJECTION INTRAMUSCULAR; INTRAVENOUS at 03:11

## 2023-03-14 RX ADMIN — SODIUM CHLORIDE 1000 ML: 0.9 INJECTION, SOLUTION INTRAVENOUS at 03:10

## 2023-03-14 RX ADMIN — SODIUM CHLORIDE 1000 ML: 0.9 INJECTION, SOLUTION INTRAVENOUS at 04:14

## 2023-03-14 RX ADMIN — AZITHROMYCIN MONOHYDRATE 500 MG: 250 TABLET ORAL at 06:43

## 2023-03-14 RX ADMIN — FAMOTIDINE 20 MG: 10 INJECTION, SOLUTION INTRAVENOUS at 03:11

## 2023-03-14 NOTE — TELEPHONE ENCOUNTER
Reason for Disposition  • [1] Vomiting AND [2] contains red blood or black ("coffee ground") material  (Exception: few red streaks in vomit that only happened once)    Answer Assessment - Initial Assessment Questions  1  VOMITING SEVERITY: "How many times have you vomited in the past 24 hours?"      - MILD:  1 - 2 times/day     - MODERATE: 3 - 5 times/day, decreased oral intake without significant weight loss or symptoms of dehydration     - SEVERE: 6 or more times/day, vomits everything or nearly everything, with significant weight loss, symptoms of dehydration           Severe     2  ONSET: "When did the vomiting begin?"       Few  Hours ago    3  FLUIDS: "What fluids or food have you vomited up today?" "Have you been able to keep any fluids down?"         No     4  ABDOMINAL PAIN: "Are your having any abdominal pain?" If yes : "How bad is it and what does it feel like?" (e g , crampy, dull, intermittent, constant)        Some yes    5  DIARRHEA: "Is there any diarrhea?" If Yes, ask: "How many times today?"        Severe to many times to count with blood    6  CONTACTS: "Is there anyone else in the family with the same symptoms?"         Unsure    7  CAUSE: "What do you think is causing your vomiting?"       Unsure    8  HYDRATION STATUS: "Any signs of dehydration?" (e g , dry mouth [not only dry lips], too weak to stand) "When did you last urinate?"       Weak     9   OTHER SYMPTOMS: "Do you have any other symptoms?" (e g , fever, headache, vertigo, vomiting blood or coffee grounds, recent head injury)         Vomiting blood and bloody diarrhea    Protocols used: [facility]

## 2023-03-14 NOTE — TELEPHONE ENCOUNTER
Regarding: Blood in Vomit and diarrhea  ----- Message from [facility] sent at 3/14/2023  1:06 AM EDT -----  "My daughter has been vomiting for the last 4 hrs and I just saw a little blood in her vomit  Also, she has blood in her diarrhea   Should I take her to the ER?"

## 2023-03-14 NOTE — Clinical Note
Roverto Mayer was seen and treated in our emergency department on 3/14/2023  Diagnosis:     Beth    She may return on this date: If you have any questions or concerns, please don't hesitate to call        Jeana Parr PA-C    ______________________________           _______________          _______________  Hospital Representative                              Date                                Time

## 2023-03-14 NOTE — ED PROVIDER NOTES
History  Chief Complaint   Patient presents with   • Vomiting     Pt arrives vomiting with diarrhea starting 2000 last night  Attributes to food she ate  Patient is a 62-year-old female with a history anxiety that presents emerged department with nausea vomiting and diarrhea symptoms  Patient denies associated symptomatology  Patient states that this afternoon she consumes nochi from [address] [place] [address] [place] and a sandwich from a deli  Patient Patient denies recent OTC and antibiotic use  Patient also reports that she had some very diarrhea that turned bloody with frequent diarrhea symptomatology  Patient presents with her mother and boyfriend this evening that provides part of patient history  Patient denies illicit drug use and EtOH use  Patient denies abdominal pain  Patient denies factors of provocative factors of aching about and smelling food  Patient denies effective treatment  Patient denies fevers, chills, constipation, urinary symptoms  Patient denies vaginal bleeding vaginal discharge  Patient denies contacts recent travel  Patient has recent fall recent trauma  Patient denies chest pain and shortness of breath  History provided by:  Patient   used: No    Vomiting  Associated symptoms: diarrhea    Associated symptoms: no abdominal pain, no chills, no cough, no fever, no headaches and no sore throat        Prior to Admission Medications   Prescriptions Last Dose Informant Patient Reported?  Taking?   escitalopram (LEXAPRO) 10 mg tablet   No No   Sig: Take 1 tablet (10 mg total) by mouth daily   norgestimate-ethinyl estradiol (ORTHO-CYCLEN) 0 25-35 MG-MCG per tablet   Yes No   Sig: Take 1 tablet by mouth daily      Facility-Administered Medications: None       Past Medical History:   Diagnosis Date   • Anxiety        Past Surgical History:   Procedure Laterality Date   • WISDOM TOOTH EXTRACTION         Family History   Problem Relation Age of Onset   • Thyroid disease Mother • No Known Problems Father      I have reviewed and agree with the history as documented  E-Cigarette/Vaping   • E-Cigarette Use Former User      E-Cigarette/Vaping Substances   • Nicotine Yes    • Flavoring Yes      Social History     Tobacco Use   • Smoking status: Never   • Smokeless tobacco: Former   Vaping Use   • Vaping Use: Former   • Substances: Nicotine, Flavoring   Substance Use Topics   • Alcohol use: Yes     Comment: Weekends   • Drug use: Never       Review of Systems   Constitutional: Negative for activity change, appetite change, chills and fever  HENT: Negative for congestion, postnasal drip, rhinorrhea, sinus pressure, sinus pain, sore throat and tinnitus  Eyes: Negative for photophobia and visual disturbance  Respiratory: Negative for cough, chest tightness and shortness of breath  Cardiovascular: Negative for chest pain and palpitations  Gastrointestinal: Positive for diarrhea, nausea and vomiting  Negative for abdominal pain and constipation  Genitourinary: Negative for difficulty urinating, dysuria, flank pain, frequency and urgency  Musculoskeletal: Negative for back pain, gait problem, neck pain and neck stiffness  Skin: Negative for pallor and rash  Allergic/Immunologic: Negative for environmental allergies and food allergies  Neurological: Positive for syncope  Negative for dizziness, weakness, numbness and headaches  Psychiatric/Behavioral: Negative for confusion  All other systems reviewed and are negative  Physical Exam  Physical Exam  Vitals and nursing note reviewed  Constitutional:       General: She is awake  Appearance: Normal appearance  She is well-developed  She is not ill-appearing, toxic-appearing or diaphoretic  Comments: /67   Pulse 88   Temp 97 6 °F (36 4 °C) (Oral)   Resp 16   Ht 5' 3" (1 6 m)   Wt 59 kg (130 lb)   LMP 02/20/2023   SpO2 98%   BMI 23 03 kg/m²      HENT:      Head: Normocephalic and atraumatic  Right Ear: Hearing, tympanic membrane, ear canal and external ear normal  No decreased hearing noted  No drainage, swelling or tenderness  No mastoid tenderness  Left Ear: Hearing, tympanic membrane, ear canal and external ear normal  No decreased hearing noted  No drainage, swelling or tenderness  No mastoid tenderness  Nose: Nose normal       Mouth/Throat:      Lips: Pink  Mouth: Mucous membranes are moist       Pharynx: Oropharynx is clear  Uvula midline  Eyes:      General: Lids are normal  Vision grossly intact  Right eye: No discharge  Left eye: No discharge  Extraocular Movements: Extraocular movements intact  Conjunctiva/sclera: Conjunctivae normal       Pupils: Pupils are equal, round, and reactive to light  Neck:      Vascular: No JVD  Trachea: Trachea and phonation normal  No tracheal tenderness or tracheal deviation  Cardiovascular:      Rate and Rhythm: Normal rate and regular rhythm  Pulses: Normal pulses  Radial pulses are 2+ on the right side and 2+ on the left side  Posterior tibial pulses are 2+ on the right side and 2+ on the left side  Heart sounds: Normal heart sounds  Pulmonary:      Effort: Pulmonary effort is normal       Breath sounds: Normal breath sounds  No stridor  No decreased breath sounds, wheezing, rhonchi or rales  Chest:      Chest wall: No tenderness  Abdominal:      General: Abdomen is flat  Bowel sounds are normal  There is no distension  Palpations: Abdomen is soft  Abdomen is not rigid  Tenderness: There is no abdominal tenderness  There is no guarding or rebound  Musculoskeletal:         General: Normal range of motion  Cervical back: Full passive range of motion without pain, normal range of motion and neck supple  No rigidity  No spinous process tenderness or muscular tenderness  Normal range of motion     Lymphadenopathy:      Head:      Right side of head: No submental, submandibular, tonsillar, preauricular, posterior auricular or occipital adenopathy  Left side of head: No submental, submandibular, tonsillar, preauricular, posterior auricular or occipital adenopathy  Cervical: No cervical adenopathy  Right cervical: No superficial, deep or posterior cervical adenopathy  Left cervical: No superficial, deep or posterior cervical adenopathy  Skin:     General: Skin is warm  Capillary Refill: Capillary refill takes less than 2 seconds  Neurological:      General: No focal deficit present  Mental Status: She is alert and oriented to person, place, and time  GCS: GCS eye subscore is 4  GCS verbal subscore is 5  GCS motor subscore is 6  Cranial Nerves: Cranial nerves 2-12 are intact  Sensory: No sensory deficit  Deep Tendon Reflexes: Reflexes are normal and symmetric  Reflex Scores:       Patellar reflexes are 2+ on the right side and 2+ on the left side  Psychiatric:         Mood and Affect: Mood normal          Speech: Speech normal          Behavior: Behavior normal  Behavior is cooperative  Thought Content:  Thought content normal          Judgment: Judgment normal          Vital Signs  ED Triage Vitals   Temperature Pulse Respirations Blood Pressure SpO2   03/14/23 0301 03/14/23 0221 03/14/23 0221 03/14/23 0221 03/14/23 0221   97 6 °F (36 4 °C) 80 16 101/67 98 %      Temp Source Heart Rate Source Patient Position - Orthostatic VS BP Location FiO2 (%)   03/14/23 0301 03/14/23 0221 03/14/23 0221 03/14/23 0221 --   Oral Monitor Lying Right arm       Pain Score       03/14/23 0221       2           Vitals:    03/14/23 0221 03/14/23 0230 03/14/23 0415   BP: 101/67 101/67 101/54   Pulse: 80 88 64   Patient Position - Orthostatic VS: Lying  Lying         Visual Acuity      ED Medications  Medications   hydrocortisone (ANUSOL-HC) rectal suppository 25 mg (0 mg Rectal Hold 3/14/23 0647)   sodium chloride 0 9 % bolus 1,000 mL (0 mL Intravenous Stopped 3/14/23 0413)   ondansetron (ZOFRAN) injection 4 mg (4 mg Intravenous Given 3/14/23 0311)   Famotidine (PF) (PEPCID) injection 20 mg (20 mg Intravenous Given 3/14/23 0311)   sodium chloride 0 9 % bolus 1,000 mL (0 mL Intravenous Stopped 3/14/23 0514)   azithromycin (ZITHROMAX) tablet 500 mg (500 mg Oral Given 3/14/23 0643)       Diagnostic Studies  Results Reviewed     Procedure Component Value Units Date/Time    CBC and differential [[id number]]  (Abnormal) Collected: 03/14/23 0307    Lab Status: Final result Specimen: Blood from Arm, Left Updated: 03/14/23 0800     WBC 16 39 Thousand/uL      RBC 4 91 Million/uL      Hemoglobin 14 9 g/dL      Hematocrit 45 8 %      MCV 93 fL      MCH 30 3 pg      MCHC 32 5 g/dL      RDW 12 7 %      MPV 10 7 fL      Platelets 295 Thousands/uL      nRBC 0 /100 WBCs      Neutrophils Relative 91 %      Immat GRANS % 0 %      Lymphocytes Relative 3 %      Monocytes Relative 6 %      Eosinophils Relative 0 %      Basophils Relative 0 %      Neutrophils Absolute 14 81 Thousands/µL      Immature Grans Absolute 0 07 Thousand/uL      Lymphocytes Absolute 0 44 Thousands/µL      Monocytes Absolute 0 99 Thousand/µL      Eosinophils Absolute 0 02 Thousand/µL      Basophils Absolute 0 06 Thousands/µL     Narrative: This is an appended report  These results have been appended to a previously verified report      Basic metabolic panel [[id number]]  (Abnormal) Collected: 03/14/23 0651    Lab Status: Final result Specimen: Blood from Arm, Left Updated: 03/14/23 0731     Sodium 142 mmol/L      Potassium 3 3 mmol/L      Chloride 120 mmol/L      CO2 16 mmol/L      ANION GAP 6 mmol/L      BUN 11 mg/dL      Creatinine 0 68 mg/dL      Glucose 105 mg/dL      Calcium 6 1 mg/dL      eGFR 122 ml/min/1 73sq m     Narrative:      [place] guidelines for Chronic Kidney Disease (CKD):   •  Stage 1 with normal or high GFR (GFR > 90 mL/min/1 73 square meters)  • Stage 2 Mild CKD (GFR = 60-89 mL/min/1 73 square meters)  •  Stage 3A Moderate CKD (GFR = 45-59 mL/min/1 73 square meters)  •  Stage 3B Moderate CKD (GFR = 30-44 mL/min/1 73 square meters)  •  Stage 4 Severe CKD (GFR = 15-29 mL/min/1 73 square meters)  •  Stage 5 End Stage CKD (GFR <15 mL/min/1 73 square meters)  Note: GFR calculation is accurate only with a steady state creatinine    Urine Microscopic [[id number]]  (Abnormal) Collected: 03/14/23 0604    Lab Status: Final result Specimen: Urine, Clean Catch Updated: 03/14/23 0649     RBC, UA 1-2 /hpf      WBC, UA 2-4 /hpf      Epithelial Cells Occasional /hpf      Bacteria, UA Occasional /hpf      MUCUS THREADS Innumerable     Hyaline Casts, UA Innumerable /lpf     UA w Reflex to Microscopic w Reflex to Culture [[id number]]  (Abnormal) Collected: 03/14/23 0604    Lab Status: Final result Specimen: Urine, Clean Catch Updated: 03/14/23 0619     Color, UA Yellow     Clarity, UA Turbid     Specific [place], UA 1 023     pH, UA 5 5     Leukocytes, UA Trace     Nitrite, UA Negative     Protein, UA Trace mg/dl      Glucose, UA Negative mg/dl      Ketones, UA 10 (1+) mg/dl      Urobilinogen, UA <2 0 mg/dl      Bilirubin, UA Negative     Occult Blood, UA Trace    Stool Enteric Bacterial Panel by PCR [[id number]] Collected: 03/14/23 0604    Lab Status: In process Specimen: Stool from Rectum Updated: 03/14/23 0610    Clostridium difficile toxin by PCR with EIA [[id number]] Collected: 03/14/23 0604    Lab Status:  In process Specimen: Stool from Per Rectum Updated: 03/14/23 0610    hCG, qualitative pregnancy [[id number]]  (Normal) Collected: 03/14/23 0307    Lab Status: Final result Specimen: Blood from Arm, Left Updated: 03/14/23 0405     Preg, Serum Negative    Comprehensive metabolic panel [[id number]]  (Abnormal) Collected: 03/14/23 0307    Lab Status: Final result Specimen: Blood from Arm, Left Updated: 03/14/23 0347     Sodium 139 mmol/L      Potassium 3 9 mmol/L      Chloride 106 mmol/L      CO2 18 mmol/L      ANION GAP 15 mmol/L      BUN 15 mg/dL      Creatinine 1 51 mg/dL      Glucose 145 mg/dL      Calcium 9 6 mg/dL      AST 21 U/L      ALT 15 U/L      Alkaline Phosphatase 48 U/L      Total Protein 7 6 g/dL      Albumin 4 5 g/dL      Total Bilirubin 0 78 mg/dL      eGFR 48 ml/min/1 73sq m     Narrative:      [place] guidelines for Chronic Kidney Disease (CKD):   •  Stage 1 with normal or high GFR (GFR > 90 mL/min/1 73 square meters)  •  Stage 2 Mild CKD (GFR = 60-89 mL/min/1 73 square meters)  •  Stage 3A Moderate CKD (GFR = 45-59 mL/min/1 73 square meters)  •  Stage 3B Moderate CKD (GFR = 30-44 mL/min/1 73 square meters)  •  Stage 4 Severe CKD (GFR = 15-29 mL/min/1 73 square meters)  •  Stage 5 End Stage CKD (GFR <15 mL/min/1 73 square meters)  Note: GFR calculation is accurate only with a steady state creatinine    Lipase [[id number]]  (Normal) Collected: 03/14/23 0307    Lab Status: Final result Specimen: Blood from Arm, Left Updated: 03/14/23 0347     Lipase 23 u/L     Protime-INR [[id number]]  (Normal) Collected: 03/14/23 0307    Lab Status: Final result Specimen: Blood from Arm, Left Updated: 03/14/23 0337     Protime 14 4 seconds      INR 1 10    APTT [[id number]]  (Normal) Collected: 03/14/23 0307    Lab Status: Final result Specimen: Blood from Arm, Left Updated: 03/14/23 0337     PTT 25 seconds                  XR chest 1 view portable   ED Interpretation by Francisco Choi PA-C (03/14 0330)   No acute cardiopulmonary disease on initial read  Procedures  Procedures         ED Course                               SBIRT 22yo+    Flowsheet Row Most Recent Value   SBIRT (25 yo +)    In order to provide better care to our patients, we are screening all of our patients for alcohol and drug use  Would it be okay to ask you these screening questions? No Filed at: 03/14/2023 3360   Initial Alcohol Screen: US AUDIT-C     1   How often do you have a drink containing alcohol? 0 Filed at: 03/14/2023 0306   Audit-C Score 0 Filed at: 03/14/2023 8711   MARIA ELENA: How many times in the past year have you    Used an illegal drug or used a prescription medication for non-medical reasons? Never Filed at: 03/14/2023 3646                    Medical Decision Making  Patient is a 19-year-old female with a history anxiety that presents emerged department with nausea vomiting and diarrhea symptoms  Patient denies associated symptomatology  Patient states that this afternoon she consumes nochi from [address] [place] [address] [employer] and a sandwich from a [employer]  Patient hemodynamically stable and afebrile  No sirs  Clinical blood labs with leukocytosis 16 39, neutrophils absolute 14 81 likely reactive to gastroenteritis  Given creatinine improved with fluid hydration 0 68 from from serum creatinine 1 51  Urinalysis patient has no dysuria symptoms; leukocytosis 2-4, RBC 1-2 with occasional bacteria- urine culture in process  Imaging chest x-ray with no acute cardiopulmonary disease on initial read  Delivered Anusol, azithromycin, Pepcid, Zofran in the emergency department; patient demonstrates decrease in presenting nausea vomiting and bowel discomfort ED symptomatology status post medication delivery  Prescribed with Romycin, Pepcid, Anusol, Zofran and counseled patient medication administration and side effects (to treat for factious gastroenteritis)  Stool cultures in process  Follow-up with PCP  Follow up with emergency department if symptoms persist or exacerbate  Patient demonstrates verbal understanding of all clinical laboratory and imaging findings, discharge instructions, follow-up, and verbally agrees with current treatment plan    Amount and/or Complexity of Data Reviewed  Labs: ordered  Radiology: ordered and independent interpretation performed  Risk  Prescription drug management            Disposition  Final diagnoses:   Gastroenteritis   Nausea & vomiting   Hypokalemia     Time reflects when diagnosis was documented in both MDM as applicable and the Disposition within this note     Time User Action Codes Description Comment    3/14/2023  7:00 AM Gerard Canary Add [K52 9] Gastroenteritis     3/14/2023  7:02 AM Gerard Canary Add [R11 2] Nausea & vomiting     3/14/2023  7:48 AM Gerard Canary Add [E87 6] Hypokalemia       ED Disposition     ED Disposition   Discharge    Condition   Stable    Date/Time   Tue Mar 14, 2023  7:48 AM    Comment   Samira Gutierrezer discharge to home/self care                 Follow-up Information     Follow up With Specialties Details Why Contact Info Additional Information    Jesus Molina MD Internal Medicine, Geriatric Medicine   [address]        [address] Emergency Department Emergency Medicine   [address] Emergency Department,  [address], [place], [place], [address]          Discharge Medication List as of 3/14/2023  7:48 AM      START taking these medications    Details   azithromycin (ZITHROMAX) 500 MG tablet Take 1 tablet (500 mg total) by mouth every 24 hours for 4 days, Starting Tue 3/14/2023, Until Sat 3/18/2023, Normal      famotidine (PEPCID) 20 mg tablet Take 1 tablet (20 mg total) by mouth daily for 3 days, Starting Tue 3/14/2023, Until Fri 3/17/2023, Normal      hydrocortisone (Anusol-HC) 25 mg suppository Insert 1 suppository (25 mg total) into the rectum 2 (two) times a day for 3 days, Starting Tue 3/14/2023, Until Fri 3/17/2023, Normal      ondansetron (ZOFRAN) 4 mg tablet Take 1 tablet (4 mg total) by mouth every 8 (eight) hours as needed for nausea or vomiting, Starting Tue 3/14/2023, Normal         CONTINUE these medications which have NOT CHANGED    Details   escitalopram (LEXAPRO) 10 mg tablet Take 1 tablet (10 mg total) by mouth daily, Starting Wed 2/15/2023, Normal      norgestimate-ethinyl estradiol (ORTHO-CYCLEN) 0 25-35 MG-MCG per tablet Take 1 tablet by mouth daily, Historical Med             No discharge procedures on file      PDMP Review       Value Time User    PDMP Reviewed  Yes 3/14/2023  7:47 AM Gopi Valencia PA-C          ED Provider  Electronically Signed by           Gopi Valencia PA-C  03/14/23 7360

## 2023-03-15 LAB
C DIFF TOX GENS STL QL NAA+PROBE: NEGATIVE
CAMPYLOBACTER DNA SPEC NAA+PROBE: NORMAL
SALMONELLA DNA SPEC QL NAA+PROBE: NORMAL
SHIGA TOXIN STX GENE SPEC NAA+PROBE: NORMAL
SHIGELLA DNA SPEC QL NAA+PROBE: NORMAL

## 2023-03-19 LAB
ATRIAL RATE: 69 BPM
P AXIS: 54 DEGREES
PR INTERVAL: 132 MS
QRS AXIS: 79 DEGREES
QRSD INTERVAL: 72 MS
QT INTERVAL: 414 MS
QTC INTERVAL: 443 MS
T WAVE AXIS: 41 DEGREES
VENTRICULAR RATE: 69 BPM

## 2023-06-11 DIAGNOSIS — F41.1 GENERALIZED ANXIETY DISORDER: ICD-10-CM

## 2023-06-12 RX ORDER — ESCITALOPRAM OXALATE 10 MG/1
10 TABLET ORAL DAILY
Qty: 90 TABLET | Refills: 0 | Status: SHIPPED | OUTPATIENT
Start: 2023-06-12

## 2023-08-24 ENCOUNTER — TELEPHONE (OUTPATIENT)
Dept: INTERNAL MEDICINE CLINIC | Facility: CLINIC | Age: 23
End: 2023-08-24

## 2023-08-24 NOTE — TELEPHONE ENCOUNTER
Patient is required to have a TB test for employment. Please advise if it is ok to schedule in office.

## 2023-09-06 ENCOUNTER — OFFICE VISIT (OUTPATIENT)
Dept: INTERNAL MEDICINE CLINIC | Facility: CLINIC | Age: 23
End: 2023-09-06
Payer: COMMERCIAL

## 2023-09-06 VITALS
HEIGHT: 63 IN | DIASTOLIC BLOOD PRESSURE: 66 MMHG | OXYGEN SATURATION: 98 % | WEIGHT: 140.2 LBS | TEMPERATURE: 98.2 F | BODY MASS INDEX: 24.84 KG/M2 | HEART RATE: 72 BPM | SYSTOLIC BLOOD PRESSURE: 100 MMHG

## 2023-09-06 DIAGNOSIS — E87.6 HYPOKALEMIA: ICD-10-CM

## 2023-09-06 DIAGNOSIS — R68.89 FLU-LIKE SYMPTOMS: Primary | ICD-10-CM

## 2023-09-06 DIAGNOSIS — F41.1 GENERALIZED ANXIETY DISORDER: ICD-10-CM

## 2023-09-06 LAB — S PYO AG THROAT QL: NEGATIVE

## 2023-09-06 PROCEDURE — 87880 STREP A ASSAY W/OPTIC: CPT | Performed by: GENERAL ACUTE CARE HOSPITAL

## 2023-09-06 PROCEDURE — 99213 OFFICE O/P EST LOW 20 MIN: CPT | Performed by: GENERAL ACUTE CARE HOSPITAL

## 2023-09-06 PROCEDURE — 87636 SARSCOV2 & INF A&B AMP PRB: CPT | Performed by: GENERAL ACUTE CARE HOSPITAL

## 2023-09-06 RX ORDER — ESCITALOPRAM OXALATE 10 MG/1
10 TABLET ORAL DAILY
Qty: 90 TABLET | Refills: 1 | Status: SHIPPED | OUTPATIENT
Start: 2023-09-06

## 2023-09-06 NOTE — PROGRESS NOTES
Name: Michael Gutierrez      : 2000      MRN: [id number]  Encounter Provider: Pat Unger MD  Encounter Date: 2023   Encounter department: [facility]    Assessment & Plan     1. Flu-like symptoms  Assessment & Plan: Will check rapid strep, covid and flu  Tylenol as needed, hydration  Work letter provided    Orders:  -     POCT rapid strepA  -     Covid/Flu- Office Collect    2. Generalized anxiety disorder  -     escitalopram (LEXAPRO) 10 mg tablet; Take 1 tablet (10 mg total) by mouth daily    3. Hypokalemia  -     Basic metabolic panel; Future         Subjective      HPI    Fever 102.5, having some chills, sweats at night, body aches, sore throat, ears feel clogged, took acetaminophen, and tylenol. Had some headaches. Denies congestion and coughing.   Not too much cough    Review of Systems    Current Outpatient Medications on File Prior to Visit   Medication Sig   • norgestimate-ethinyl estradiol (ORTHO-CYCLEN) 0.25-35 MG-MCG per tablet Take 1 tablet by mouth daily   • [DISCONTINUED] escitalopram (LEXAPRO) 10 mg tablet Take 1 tablet (10 mg total) by mouth daily   • famotidine (PEPCID) 20 mg tablet Take 1 tablet (20 mg total) by mouth daily for 3 days (Patient not taking: Reported on 2023)   • hydrocortisone (Anusol-HC) 25 mg suppository Insert 1 suppository (25 mg total) into the rectum 2 (two) times a day for 3 days (Patient not taking: Reported on 2023)   • ondansetron (ZOFRAN) 4 mg tablet Take 1 tablet (4 mg total) by mouth every 8 (eight) hours as needed for nausea or vomiting (Patient not taking: Reported on 2023)       Objective     /66 (BP Location: Left arm, Patient Position: Sitting, Cuff Size: Standard)   Pulse 72   Temp 98.2 °F (36.8 °C) (Tympanic)   Ht 5' 3" (1.6 m)   Wt 63.6 kg (140 lb 3.2 oz)   SpO2 98%   BMI 24.84 kg/m²     Physical Exam  Pat Unger MD

## 2023-09-06 NOTE — LETTER
September 6, 2023     Patient: Martinez Bella  YOB: 2000  Date of Visit: 9/6/2023      To Whom it May Concern:    Martinez Bella is under my professional care. Tasia Li was seen in my office on 9/6/2023. Tasia Li had a medical condition that is currently under evaluation and treatment. Tasia Li may return to work on 9/8/2023 if symptoms improve and if tests are negative. If you have any questions or concerns, please don't hesitate to call.          Sincerely,          Nahid Nolasco MD        CC: No Recipients

## 2023-09-07 ENCOUNTER — TELEPHONE (OUTPATIENT)
Dept: INTERNAL MEDICINE CLINIC | Facility: CLINIC | Age: 23
End: 2023-09-07

## 2023-09-07 LAB
FLUAV RNA RESP QL NAA+PROBE: NEGATIVE
FLUBV RNA RESP QL NAA+PROBE: NEGATIVE
SARS-COV-2 RNA RESP QL NAA+PROBE: POSITIVE

## 2023-09-07 NOTE — TELEPHONE ENCOUNTER
Patient called in saw her results on Mychart advised that covid was positive and flu was negative. She asked if her work note can be updated because she will remain isolated 5 days and then wear mask 5 day after. Would you be able to write a new work note or would you want me to do it here and she said she would check her mychart?  Please advise

## 2023-09-08 NOTE — TELEPHONE ENCOUNTER
[facility]   Please write a new note stating due to her positive covid result, recommend isolation till 9/10/2023. Patient may return to work on 9/11/23 (Monday) if symptoms improve and wear mask for another 5 days at work (9/11/23 to 9/15/23).

## 2024-02-28 ENCOUNTER — OFFICE VISIT (OUTPATIENT)
Dept: INTERNAL MEDICINE CLINIC | Facility: CLINIC | Age: 24
End: 2024-02-28
Payer: COMMERCIAL

## 2024-02-28 VITALS
OXYGEN SATURATION: 98 % | HEIGHT: 63 IN | HEART RATE: 62 BPM | WEIGHT: 138.6 LBS | SYSTOLIC BLOOD PRESSURE: 104 MMHG | BODY MASS INDEX: 24.56 KG/M2 | DIASTOLIC BLOOD PRESSURE: 64 MMHG

## 2024-02-28 DIAGNOSIS — H10.33 ACUTE BACTERIAL CONJUNCTIVITIS OF BOTH EYES: Primary | ICD-10-CM

## 2024-02-28 PROCEDURE — 99213 OFFICE O/P EST LOW 20 MIN: CPT | Performed by: GENERAL ACUTE CARE HOSPITAL

## 2024-02-28 RX ORDER — OFLOXACIN 3 MG/ML
1 SOLUTION/ DROPS OPHTHALMIC 4 TIMES DAILY
Qty: 5 ML | Refills: 0 | Status: SHIPPED | OUTPATIENT
Start: 2024-02-28 | End: 2024-03-06

## 2024-02-28 NOTE — PROGRESS NOTES
"Name: Beth Weldon      : 2000      MRN: [id number]  Encounter Provider: Jocelin Mckeon MD  Encounter Date: 2024   [facility] department: [facility]    Assessment & Plan     1. Acute bacterial conjunctivitis of both eyes  -     ofloxacin (OCUFLOX) 0.3 % ophthalmic solution; Administer 1 drop to both eyes 4 (four) times a day for 7 days           Subjective      HPI  R eye redness and crusty discharge from yesterday, left eye some discharge started today  Review of Systems    Current Outpatient Medications on File Prior to Visit   Medication Sig    escitalopram (LEXAPRO) 10 mg tablet Take 1 tablet (10 mg total) by mouth daily    norgestimate-ethinyl estradiol (ORTHO-CYCLEN) 0.25-35 MG-MCG per tablet Take 1 tablet by mouth daily    [DISCONTINUED] famotidine (PEPCID) 20 mg tablet Take 1 tablet (20 mg total) by mouth daily for 3 days (Patient not taking: Reported on 2023)    [DISCONTINUED] hydrocortisone (Anusol-HC) 25 mg suppository Insert 1 suppository (25 mg total) into the rectum 2 (two) times a day for 3 days (Patient not taking: Reported on 2023)    [DISCONTINUED] ondansetron (ZOFRAN) 4 mg tablet Take 1 tablet (4 mg total) by mouth every 8 (eight) hours as needed for nausea or vomiting (Patient not taking: Reported on 2023)       Objective     /64 (BP Location: Left arm, Patient Position: Sitting, Cuff Size: Standard)   Pulse 62   Ht 5' 3\" (1.6 m)   Wt 62.9 kg (138 lb 9.6 oz)   SpO2 98%   BMI 24.55 kg/m²     Physical Exam  Jocelin Mckeon MD    "

## 2024-03-01 DIAGNOSIS — F41.1 GENERALIZED ANXIETY DISORDER: ICD-10-CM

## 2024-03-01 RX ORDER — ESCITALOPRAM OXALATE 10 MG/1
10 TABLET ORAL DAILY
Qty: 90 TABLET | Refills: 1 | Status: SHIPPED | OUTPATIENT
Start: 2024-03-01

## 2024-03-05 NOTE — ASSESSMENT & PLAN NOTE
Routine blood works:ordered  Vaccines:reviewed. [facility]  Cervical Cancer screen:1/2024 wnl.   Depression screening:neg  Healthy diet and regular exercise

## 2024-03-05 NOTE — PROGRESS NOTES
"Name: Beth Weldon      : 2000      MRN: [id number]  Encounter Provider: Jocelin Mckeon MD  Encounter Date: 3/11/2024   Enc[facility] department: [facility]    Assessment & Plan     1. Annual physical exam  Assessment & Plan:  Routine blood works:ordered  Vaccines:reviewed. UTD  Cervical Cancer screen:2024 wnl.   Depression screening:neg  Healthy diet and regular exercise             Subjective      HPI  Annual physical  Doing well in general  Review of Systems    Current Outpatient Medications on File Prior to Visit   Medication Sig    escitalopram (LEXAPRO) 10 mg tablet TAKE 1 TABLET BY MOUTH EVERY DAY    norgestimate-ethinyl estradiol (ORTHO-CYCLEN) 0.25-35 MG-MCG per tablet Take 1 tablet by mouth daily       Objective     /76 (BP Location: Left arm, Patient Position: Sitting, Cuff Size: Standard)   Ht 5' 3\" (1.6 m)   Wt 63 kg (138 lb 12.8 oz)   BMI 24.59 kg/m²     Physical Exam  Constitutional:       General: She is not in acute distress.     Appearance: She is well-developed.   HENT:      Head: Normocephalic and atraumatic.      Right Ear: External ear normal.      Left Ear: External ear normal.   Eyes:      General: No scleral icterus.     Conjunctiva/sclera: Conjunctivae normal.   Neck:      Thyroid: No thyromegaly.      Trachea: No tracheal deviation.   Cardiovascular:      Rate and Rhythm: Normal rate and regular rhythm.      Heart sounds: Normal heart sounds.   Pulmonary:      Effort: Pulmonary effort is normal. No respiratory distress.      Breath sounds: Normal breath sounds. No wheezing or rales.   Abdominal:      General: Bowel sounds are normal.      Palpations: Abdomen is soft.      Tenderness: There is no abdominal tenderness. There is no guarding or rebound.   Musculoskeletal:      Cervical back: Normal range of motion and neck supple.   Lymphadenopathy:      Cervical: No cervical adenopathy.   Neurological:      Mental Status: She is alert and oriented to person, " place, and time.   Psychiatric:         Behavior: Behavior normal.         Thought Content: Thought content normal.         Judgment: Judgment normal.       Jocelin Mckeon MD

## 2024-03-11 ENCOUNTER — OFFICE VISIT (OUTPATIENT)
Dept: INTERNAL MEDICINE CLINIC | Facility: CLINIC | Age: 24
End: 2024-03-11
Payer: COMMERCIAL

## 2024-03-11 VITALS
HEIGHT: 63 IN | SYSTOLIC BLOOD PRESSURE: 118 MMHG | BODY MASS INDEX: 24.59 KG/M2 | WEIGHT: 138.8 LBS | DIASTOLIC BLOOD PRESSURE: 76 MMHG

## 2024-03-11 DIAGNOSIS — Z00.00 ANNUAL PHYSICAL EXAM: Primary | ICD-10-CM

## 2024-03-11 PROCEDURE — 99395 PREV VISIT EST AGE 18-39: CPT | Performed by: GENERAL ACUTE CARE HOSPITAL

## 2024-04-28 PROBLEM — H10.33 ACUTE BACTERIAL CONJUNCTIVITIS OF BOTH EYES: Status: RESOLVED | Noted: 2024-02-28 | Resolved: 2024-04-28

## 2024-08-19 ENCOUNTER — TELEPHONE (OUTPATIENT)
Age: 24
End: 2024-08-19

## 2024-08-19 NOTE — TELEPHONE ENCOUNTER
Pt dropped of forms to be filled out. Pt needs them done by Monday. Put forms in red folder in your bin.

## 2024-08-20 NOTE — TELEPHONE ENCOUNTER
Her physical form completed.      Not sure where she had her PPD done on 8/18/24. I don't find report in chart. It needs to be read and put result in form.

## 2024-08-23 NOTE — TELEPHONE ENCOUNTER
Patient called in to follow-up on if physical form for school was ready for . Advised patient form is ready for  and of $15.00 payment needed for the form. Patient also stated she got her PPD done at the [facility] on [address] [facility] and will bring those papers with her when she comes to  form in an hour. NFA needed at this time.

## 2024-08-31 DIAGNOSIS — F41.1 GENERALIZED ANXIETY DISORDER: ICD-10-CM

## 2024-09-01 RX ORDER — ESCITALOPRAM OXALATE 10 MG/1
10 TABLET ORAL DAILY
Qty: 90 TABLET | Refills: 1 | Status: SHIPPED | OUTPATIENT
Start: 2024-09-01

## 2024-11-29 ENCOUNTER — TELEMEDICINE (OUTPATIENT)
Dept: OTHER | Facility: HOSPITAL | Age: 24
End: 2024-11-29
Payer: COMMERCIAL

## 2024-11-29 DIAGNOSIS — S00.06XA TICK BITE OF SCALP, INITIAL ENCOUNTER: Primary | ICD-10-CM
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
## 2024-11-29 PROBLEM — Z00.00 ANNUAL PHYSICAL EXAM: Status: RESOLVED | Noted: 2020-01-19 | Resolved: 2024-11-29

## 2024-11-29 PROBLEM — R68.89 FLU-LIKE SYMPTOMS: Status: RESOLVED | Noted: 2023-09-06 | Resolved: 2024-11-29

## 2024-11-29 PROCEDURE — 99213 OFFICE O/P EST LOW 20 MIN: CPT | Performed by: PHYSICIAN ASSISTANT

## 2024-11-29 RX ORDER — DOXYCYCLINE 100 MG/1
200 TABLET ORAL DAILY
Qty: 2 TABLET | Refills: 0 | Status: SHIPPED | OUTPATIENT
Start: 2024-11-29 | End: 2024-11-30

## 2024-11-29 NOTE — PROGRESS NOTES
Virtual Regular Visit  Name: Beth Weldon      : 2000      MRN: [id number]  Encounter Provider: Shannon D Severino, PA-C  Encounter Date: 2024   Encounter department: [facility]       Verification of patient location:  Patient is located at Home in the following state in which I hold an active license [place] :  Assessment & Plan  Tick bite of scalp, initial encounter    Orders:    doxycycline (ADOXA) 100 MG tablet; Take 2 tablets (200 mg total) by mouth daily for 1 day        Encounter provider Shannon D Severino, PA-C    The patient was identified by name and date of birth. Beth Weldon was informed that this is a telemedicine visit and that the visit is being conducted through the Epic Embedded platform. She agrees to proceed..  My office door was closed. No one else was in the room.  She acknowledged consent and understanding of privacy and security of the video platform. The patient has agreed to participate and understands they can discontinue the visit at any time.    Patient is aware this is a billable service.     History was obtained from: History obtained from: patient  History of Present Illness     Pt reports she found a tick in her scalp today. Removed with tweezers.       Review of Systems   Constitutional:  Negative for fever.   HENT:  Negative for nosebleeds.    Eyes:  Negative for redness.   Respiratory:  Negative for shortness of breath.    Cardiovascular:  Negative for chest pain.   Gastrointestinal:  Negative for blood in stool.   Genitourinary:  Negative for hematuria.   Musculoskeletal:  Negative for gait problem.   Skin:  Negative for rash.   Neurological:  Negative for seizures.   Psychiatric/Behavioral:  Negative for behavioral problems.        Objective   There were no vitals taken for this visit.    Physical Exam  Constitutional:       General: She is not in acute distress.     Appearance: Normal appearance. She is not ill-appearing or toxic-appearing.   HENT:      Head:  Normocephalic and atraumatic.      Nose: No rhinorrhea.      Mouth/Throat:      Mouth: Mucous membranes are moist.   Eyes:      Conjunctiva/sclera: Conjunctivae normal.   Pulmonary:      Effort: Pulmonary effort is normal. No respiratory distress.      Breath sounds: No wheezing (no gross audible wheeze through computer).   Musculoskeletal:      Cervical back: Normal range of motion.   Skin:     Findings: No rash (on face or neck).   Neurological:      Mental Status: She is alert.      Cranial Nerves: No dysarthria or facial asymmetry.   Psychiatric:         Mood and Affect: Mood normal.         Behavior: Behavior normal.         Visit Time  Total Visit Duration: 13 minutes not including the time spent for establishing the audio/video connection.

## 2024-11-29 NOTE — PATIENT INSTRUCTIONS
[email or web address] for tick testing    One time dose of doxycycline    Follow up with PCP for fever, fatigue, joint pain, fever

## 2025-02-23 DIAGNOSIS — F41.1 GENERALIZED ANXIETY DISORDER: ICD-10-CM

## 2025-02-24 RX ORDER — ESCITALOPRAM OXALATE 10 MG/1
10 TABLET ORAL DAILY
Qty: 90 TABLET | Refills: 1 | Status: SHIPPED | OUTPATIENT
Start: 2025-02-24

## 2025-03-21 RX ORDER — NORGESTIMATE AND ETHINYL ESTRADIOL 0.25-0.035
1 KIT ORAL DAILY
Qty: 28 TABLET | Refills: 0 | OUTPATIENT
Start: 2025-03-21

## 2025-03-25 NOTE — ASSESSMENT & PLAN NOTE
Routine blood works:ordered  Vaccines:reviewed. [facility]  Cervical Cancer screen:1/2024 wnl.   Depression screening:neg  Healthy diet and regular exercise  Patient needs yearly PPD test for her job.  She works as a .  Okay to schedule with nurse later this year for PPD test.    Orders:    TSH, 3rd generation with Free T4 reflex; Future    Comprehensive metabolic panel; Future    Iron Panel (Includes Ferritin, Iron Sat%, Iron, and TIBC); Future    Type and screen; Future

## 2025-03-31 ENCOUNTER — OFFICE VISIT (OUTPATIENT)
Dept: INTERNAL MEDICINE CLINIC | Facility: CLINIC | Age: 25
End: 2025-03-31
Payer: COMMERCIAL

## 2025-03-31 VITALS
WEIGHT: 140.2 LBS | HEART RATE: 61 BPM | HEIGHT: 65 IN | TEMPERATURE: 97.2 F | DIASTOLIC BLOOD PRESSURE: 76 MMHG | BODY MASS INDEX: 23.36 KG/M2 | SYSTOLIC BLOOD PRESSURE: 124 MMHG | OXYGEN SATURATION: 99 %

## 2025-03-31 DIAGNOSIS — F41.1 GENERALIZED ANXIETY DISORDER: ICD-10-CM

## 2025-03-31 DIAGNOSIS — Z00.00 ANNUAL PHYSICAL EXAM: Primary | ICD-10-CM

## 2025-03-31 PROCEDURE — 99395 PREV VISIT EST AGE 18-39: CPT | Performed by: GENERAL ACUTE CARE HOSPITAL

## 2025-03-31 NOTE — PROGRESS NOTES
Name: Beth Weldon      : 2000      MRN: [id number]  Encounter Provider: Jocelin Mckeon MD  Encounter Date: 3/31/2025   [facility]: [facility]  :  Assessment & Plan  Annual physical exam  Routine blood works:ordered  Vaccines:reviewed. UTD  Cervical Cancer screen:2024 wnl.   Depression screening:neg  Healthy diet and regular exercise  Patient needs yearly PPD test for her job.  She works as a .  Okay to schedule with nurse later this year for PPD test.    Orders:    TSH, 3rd generation with Free T4 reflex; Future    Comprehensive metabolic panel; Future    Iron Panel (Includes Ferritin, Iron Sat%, Iron, and TIBC); Future    Type and screen; Future    Generalized anxiety disorder  Under control  Continue lexapro                History of Present Illness   HPI  Stopped OCP a week ago.  Patient would like to see how she feels off it.  Otherwise she feels well in general.  No major complaint  Review of Systems   Constitutional:  Negative for chills, fatigue and fever.   HENT:  Negative for congestion, nosebleeds, postnasal drip, sneezing, sore throat and trouble swallowing.    Eyes:  Negative for pain.   Respiratory:  Negative for cough, chest tightness, shortness of breath and wheezing.    Cardiovascular:  Negative for chest pain, palpitations and leg swelling.   Gastrointestinal:  Negative for abdominal pain, constipation, diarrhea, nausea and vomiting.   Endocrine: Negative for cold intolerance, heat intolerance, polydipsia and polyuria.   Genitourinary:  Negative for difficulty urinating, dysuria, flank pain and hematuria.   Musculoskeletal:  Negative for arthralgias and myalgias.   Skin:  Negative for rash.   Neurological:  Negative for dizziness, tremors, weakness and headaches.   Hematological:  Does not bruise/bleed easily.   Psychiatric/Behavioral:  Negative for confusion and dysphoric mood. The patient is not nervous/anxious.        Objective   BP  "124/76 (BP Location: Left arm, Patient Position: Sitting, Cuff Size: Standard)   Pulse 61   Temp (!) 97.2 °F (36.2 °C) (Tympanic)   Ht 5' 4.5\" (1.638 m)   Wt 63.6 kg (140 lb 3.2 oz)   SpO2 99%   BMI 23.69 kg/m²      Physical Exam  Constitutional:       General: She is not in acute distress.     Appearance: She is well-developed.   HENT:      Head: Normocephalic and atraumatic.      Right Ear: External ear normal.      Left Ear: External ear normal.   Eyes:      General: No scleral icterus.     Conjunctiva/sclera: Conjunctivae normal.   Neck:      Thyroid: No thyromegaly.      Trachea: No tracheal deviation.   Cardiovascular:      Rate and Rhythm: Normal rate and regular rhythm.      Heart sounds: Normal heart sounds.   Pulmonary:      Effort: Pulmonary effort is normal. No respiratory distress.      Breath sounds: Normal breath sounds. No wheezing or rales.   Abdominal:      General: Bowel sounds are normal.      Palpations: Abdomen is soft.      Tenderness: There is no abdominal tenderness. There is no guarding or rebound.   Musculoskeletal:      Cervical back: Normal range of motion and neck supple.   Lymphadenopathy:      Cervical: No cervical adenopathy.   Neurological:      Mental Status: She is alert and oriented to person, place, and time.   Psychiatric:         Behavior: Behavior normal.         Thought Content: Thought content normal.         Judgment: Judgment normal.         "

## 2025-07-09 ENCOUNTER — TELEPHONE (OUTPATIENT)
Age: 25
End: 2025-07-09

## 2025-07-09 NOTE — TELEPHONE ENCOUNTER
Patient called requesting TB TEST vaccine(s) for the following reason(s): WORK.     No order in system. AC can not schedule without pending order. Please advise and return call to patient to assist with scheduling   Thank you!

## 2025-07-10 ENCOUNTER — TELEPHONE (OUTPATIENT)
Age: 25
End: 2025-07-10

## 2025-07-11 ENCOUNTER — CLINICAL SUPPORT (OUTPATIENT)
Dept: INTERNAL MEDICINE CLINIC | Facility: CLINIC | Age: 25
End: 2025-07-11
Payer: COMMERCIAL

## 2025-07-11 DIAGNOSIS — Z11.1 ENCOUNTER FOR TUBERCULIN SKIN TEST: Primary | ICD-10-CM

## 2025-07-11 PROCEDURE — 86580 TB INTRADERMAL TEST: CPT

## 2025-07-15 ENCOUNTER — TELEPHONE (OUTPATIENT)
Dept: INTERNAL MEDICINE CLINIC | Facility: CLINIC | Age: 25
End: 2025-07-15